# Patient Record
Sex: FEMALE | Race: WHITE | Employment: FULL TIME | URBAN - METROPOLITAN AREA
[De-identification: names, ages, dates, MRNs, and addresses within clinical notes are randomized per-mention and may not be internally consistent; named-entity substitution may affect disease eponyms.]

---

## 2017-01-18 ENCOUNTER — NURSE ONLY (OUTPATIENT)
Dept: HEMATOLOGY/ONCOLOGY | Age: 52
End: 2017-01-18
Attending: INTERNAL MEDICINE
Payer: COMMERCIAL

## 2017-01-18 DIAGNOSIS — D50.9 IRON DEFICIENCY ANEMIA, UNSPECIFIED IRON DEFICIENCY ANEMIA TYPE: ICD-10-CM

## 2017-01-18 DIAGNOSIS — R76.8 ELEVATED SERUM IMMUNOGLOBULIN FREE LIGHT CHAINS: ICD-10-CM

## 2017-01-18 LAB
BASOPHILS # BLD AUTO: 0.07 X10(3) UL (ref 0–0.1)
BASOPHILS NFR BLD AUTO: 1.2 %
DEPRECATED HBV CORE AB SER IA-ACNC: 248.7 NG/ML (ref 10–291)
EOSINOPHIL # BLD AUTO: 0.1 X10(3) UL (ref 0–0.3)
EOSINOPHIL NFR BLD AUTO: 1.8 %
ERYTHROCYTE [DISTWIDTH] IN BLOOD BY AUTOMATED COUNT: 14.1 % (ref 11.5–16)
HCT VFR BLD AUTO: 42.5 % (ref 34–50)
HGB BLD-MCNC: 13.6 G/DL (ref 12–16)
IMMATURE GRANULOCYTE COUNT: 0.02 X10(3) UL (ref 0–1)
IMMATURE GRANULOCYTE RATIO %: 0.4 %
IRON SATURATION: 26 % (ref 13–45)
IRON: 97 UG/DL (ref 28–170)
LYMPHOCYTES # BLD AUTO: 1.56 X10(3) UL (ref 0.9–4)
LYMPHOCYTES NFR BLD AUTO: 27.8 %
MCH RBC QN AUTO: 29.1 PG (ref 27–33.2)
MCHC RBC AUTO-ENTMCNC: 32 G/DL (ref 31–37)
MCV RBC AUTO: 90.8 FL (ref 81–100)
MONOCYTES # BLD AUTO: 0.59 X10(3) UL (ref 0.1–0.6)
MONOCYTES NFR BLD AUTO: 10.5 %
NEUTROPHIL ABS PRELIM: 3.28 X10 (3) UL (ref 1.3–6.7)
NEUTROPHILS # BLD AUTO: 3.28 X10(3) UL (ref 1.3–6.7)
NEUTROPHILS NFR BLD AUTO: 58.3 %
PLATELET # BLD AUTO: 328 10(3)UL (ref 150–450)
RBC # BLD AUTO: 4.68 X10(6)UL (ref 3.8–5.1)
RED CELL DISTRIBUTION WIDTH-SD: 47.3 FL (ref 35.1–46.3)
TOTAL IRON BINDING CAPACITY: 374 UG/DL (ref 298–536)
TRANSFERRIN: 251 MG/DL (ref 200–360)
WBC # BLD AUTO: 5.6 X10(3) UL (ref 4–13)

## 2017-01-18 PROCEDURE — 84165 PROTEIN E-PHORESIS SERUM: CPT

## 2017-01-18 PROCEDURE — 36415 COLL VENOUS BLD VENIPUNCTURE: CPT

## 2017-01-18 PROCEDURE — 83883 ASSAY NEPHELOMETRY NOT SPEC: CPT

## 2017-01-18 PROCEDURE — 83550 IRON BINDING TEST: CPT

## 2017-01-18 PROCEDURE — 83540 ASSAY OF IRON: CPT

## 2017-01-18 PROCEDURE — 82728 ASSAY OF FERRITIN: CPT

## 2017-01-18 PROCEDURE — 85025 COMPLETE CBC W/AUTO DIFF WBC: CPT

## 2017-01-18 PROCEDURE — 86334 IMMUNOFIX E-PHORESIS SERUM: CPT

## 2017-01-20 LAB
A/G RATIO: 1.46
ALBUMIN, SERUM: 4.22 G/DL (ref 3.5–4.8)
ALPHA-1 GLOBULIN: 0.19 G/DL (ref 0.1–0.3)
ALPHA-2 GLOBULIN: 1 G/DL (ref 0.6–1)
BETA GLOBULIN: 0.76 G/DL (ref 0.7–1.2)
GAMMA GLOBULIN: 0.93 G/DL (ref 0.6–1.6)
KAPPA FREE LIGHT CHAIN: 2.31 MG/DL (ref 0.33–1.94)
KAPPA/LAMBDA FLC RATIO: 1.21 (ref 0.26–1.65)
LAMBDA FREE LIGHT CHAIN: 1.9 MG/DL (ref 0.57–2.63)
TOTAL PROTEIN,SERUM: 7.1 G/DL (ref 6.1–8.3)

## 2017-03-08 ENCOUNTER — APPOINTMENT (OUTPATIENT)
Dept: HEMATOLOGY/ONCOLOGY | Age: 52
End: 2017-03-08
Attending: INTERNAL MEDICINE
Payer: COMMERCIAL

## 2017-07-28 ENCOUNTER — APPOINTMENT (OUTPATIENT)
Dept: GENERAL RADIOLOGY | Age: 52
End: 2017-07-28
Attending: PHYSICIAN ASSISTANT
Payer: COMMERCIAL

## 2017-07-28 ENCOUNTER — HOSPITAL ENCOUNTER (EMERGENCY)
Age: 52
Discharge: HOME | End: 2017-07-28
Attending: EMERGENCY MEDICINE
Payer: COMMERCIAL

## 2017-07-28 VITALS
TEMPERATURE: 98 F | DIASTOLIC BLOOD PRESSURE: 96 MMHG | OXYGEN SATURATION: 98 % | HEIGHT: 64 IN | WEIGHT: 137 LBS | RESPIRATION RATE: 16 BRPM | HEART RATE: 82 BPM | SYSTOLIC BLOOD PRESSURE: 127 MMHG | BODY MASS INDEX: 23.39 KG/M2

## 2017-07-28 DIAGNOSIS — S52.502A CLOSED FRACTURE OF LEFT DISTAL RADIUS AND ULNA, INITIAL ENCOUNTER: Primary | ICD-10-CM

## 2017-07-28 DIAGNOSIS — S52.602A CLOSED FRACTURE OF LEFT DISTAL RADIUS AND ULNA, INITIAL ENCOUNTER: Primary | ICD-10-CM

## 2017-07-28 PROCEDURE — 99284 EMERGENCY DEPT VISIT MOD MDM: CPT

## 2017-07-28 PROCEDURE — 73130 X-RAY EXAM OF HAND: CPT | Performed by: EMERGENCY MEDICINE

## 2017-07-28 PROCEDURE — 73130 X-RAY EXAM OF HAND: CPT | Performed by: PHYSICIAN ASSISTANT

## 2017-07-28 PROCEDURE — 73090 X-RAY EXAM OF FOREARM: CPT | Performed by: PHYSICIAN ASSISTANT

## 2017-07-28 PROCEDURE — 29125 APPL SHORT ARM SPLINT STATIC: CPT

## 2017-07-28 RX ORDER — HYDROCODONE BITARTRATE AND ACETAMINOPHEN 5; 325 MG/1; MG/1
1 TABLET ORAL ONCE
Status: COMPLETED | OUTPATIENT
Start: 2017-07-28 | End: 2017-07-28

## 2017-07-28 RX ORDER — HYDROCODONE BITARTRATE AND ACETAMINOPHEN 5; 325 MG/1; MG/1
1 TABLET ORAL EVERY 4 HOURS PRN
Qty: 15 TABLET | Refills: 0 | Status: SHIPPED | OUTPATIENT
Start: 2017-07-28 | End: 2017-08-04

## 2017-07-28 NOTE — ED INITIAL ASSESSMENT (HPI)
Pt states she injured her left hand yesterday. No deformity noted. + swelling noted with bruising. + CMS noted. States she took \"ibuprofen at 9 am today\".

## 2017-07-28 NOTE — ED PROVIDER NOTES
Patient Seen in: THE [facility] Emergency Department In [place]    History   Patient presents with:  Upper Extremity Injury (musculoskeletal)    Stated Complaint: LEFT HAND INJURY    SHANNON Iyer is a 41-year-old female presents today for evaluation of left ha sucralfate 1 G Oral Tab,  Take 1 g by mouth 4 (four) times daily before meals and nightly. BuPROPion HCl ER, SR, (WELLBUTRIN SR) 150 MG Oral Tablet 12 Hr,  Take 150 mg by mouth 2 (two) times daily.        Family History   Problem Relation Age of Onset   • Neurological: She is alert and oriented to person, place, and time. Skin: Skin is warm and dry. Nursing note and vitals reviewed.             ED Course   Labs Reviewed - No data to display    =========================================================== Hazel Geiger MD on 7/28/2017 at 12:46     Approved by: Hazel Geiger MD            A sugar tong fiberglass postmold was placed on the patient that is secured with ace wraps.   The affected area and the joints proximal and distal to the area are properly im

## 2017-07-31 ENCOUNTER — CHARTING TRANS (OUTPATIENT)
Dept: OTHER | Age: 52
End: 2017-07-31

## 2017-08-01 ENCOUNTER — TELEPHONE (OUTPATIENT)
Dept: HEMATOLOGY/ONCOLOGY | Facility: HOSPITAL | Age: 52
End: 2017-08-01

## 2017-08-01 NOTE — TELEPHONE ENCOUNTER
Fractured wrist and scheduled for surgery tomorrow. Asking if she should stop her daily Aspirin? Per Dr. Ronnie Simons patient should alert surgeon and he should make the decision to hold.     Encouraged to follow up with us and have ordered labs drawn and MD burris

## 2017-11-17 ENCOUNTER — TELEPHONE (OUTPATIENT)
Dept: HEMATOLOGY/ONCOLOGY | Facility: HOSPITAL | Age: 52
End: 2017-11-17

## 2017-11-17 DIAGNOSIS — R76.0 ANTIPHOSPHOLIPID ANTIBODY POSITIVE: ICD-10-CM

## 2017-11-17 DIAGNOSIS — D50.9 IRON DEFICIENCY ANEMIA: ICD-10-CM

## 2017-11-17 NOTE — TELEPHONE ENCOUNTER
Patient last seen in December 2016 for iron deficiency anemia and elevated monoclonal protein. Called today to report increase fatigue. States she is seeing a Gastroenterologist for recent blood in stool. Asking to have labs drawn to check her \"anemia. \"

## 2018-02-20 ENCOUNTER — OFFICE VISIT (OUTPATIENT)
Dept: OCCUPATIONAL MEDICINE | Age: 53
End: 2018-02-20
Attending: FAMILY MEDICINE

## 2018-04-02 ENCOUNTER — NURSE ONLY (OUTPATIENT)
Dept: HEMATOLOGY/ONCOLOGY | Facility: HOSPITAL | Age: 53
End: 2018-04-02
Attending: INTERNAL MEDICINE
Payer: COMMERCIAL

## 2018-04-02 DIAGNOSIS — R76.0 ANTIPHOSPHOLIPID ANTIBODY POSITIVE: ICD-10-CM

## 2018-04-02 DIAGNOSIS — D50.9 IRON DEFICIENCY ANEMIA: ICD-10-CM

## 2018-04-02 PROCEDURE — 36415 COLL VENOUS BLD VENIPUNCTURE: CPT

## 2018-04-02 PROCEDURE — 85025 COMPLETE CBC W/AUTO DIFF WBC: CPT

## 2018-04-02 PROCEDURE — 82728 ASSAY OF FERRITIN: CPT

## 2018-04-02 PROCEDURE — 83540 ASSAY OF IRON: CPT

## 2018-04-02 PROCEDURE — 83550 IRON BINDING TEST: CPT

## 2018-04-03 ENCOUNTER — TELEPHONE (OUTPATIENT)
Dept: HEMATOLOGY/ONCOLOGY | Facility: HOSPITAL | Age: 53
End: 2018-04-03

## 2018-04-03 NOTE — TELEPHONE ENCOUNTER
Radha Millan APN  P [facility]             Pls call pt and let her know her ferritin is still within acceptable range.  Thanks.  Labs in a month.  Check to see if she is on iron PO.  Thanks. Pt states she is taking 27 mg of iron.

## 2018-04-09 ENCOUNTER — TELEPHONE (OUTPATIENT)
Dept: HEMATOLOGY/ONCOLOGY | Facility: HOSPITAL | Age: 53
End: 2018-04-09

## 2018-04-09 NOTE — TELEPHONE ENCOUNTER
Dino Pedraza MD   You 32 minutes ago (9:27 AM)     With her iron levels as good as they are, that's not the cause of her fatigue.  She should contact her primary care provider to evaluate the fatigue further.  (Routing comment)

## 2018-04-09 NOTE — TELEPHONE ENCOUNTER
Spoke with pt this morning, pt states she is feeling very exhausted and fatigued and is trying to figure out why. States she had labs done last week and was told that her ferritin and iron looked good. Pt denies any bleeding.  Reports she is taking her iron

## 2018-07-03 ENCOUNTER — OFFICE VISIT (OUTPATIENT)
Dept: FAMILY MEDICINE CLINIC | Facility: CLINIC | Age: 53
End: 2018-07-03

## 2018-07-03 ENCOUNTER — HOSPITAL ENCOUNTER (OUTPATIENT)
Dept: MAMMOGRAPHY | Age: 53
Discharge: HOME | End: 2018-07-03
Attending: FAMILY MEDICINE
Payer: COMMERCIAL

## 2018-07-03 VITALS
HEART RATE: 70 BPM | HEIGHT: 63.25 IN | BODY MASS INDEX: 26.92 KG/M2 | TEMPERATURE: 98 F | SYSTOLIC BLOOD PRESSURE: 134 MMHG | RESPIRATION RATE: 16 BRPM | DIASTOLIC BLOOD PRESSURE: 100 MMHG | WEIGHT: 153.81 LBS

## 2018-07-03 DIAGNOSIS — I10 ESSENTIAL HYPERTENSION: ICD-10-CM

## 2018-07-03 DIAGNOSIS — Z01.419 WELL WOMAN EXAM WITH ROUTINE GYNECOLOGICAL EXAM: Primary | ICD-10-CM

## 2018-07-03 DIAGNOSIS — Z12.39 SCREENING FOR BREAST CANCER: ICD-10-CM

## 2018-07-03 DIAGNOSIS — F41.9 ANXIETY: ICD-10-CM

## 2018-07-03 DIAGNOSIS — F43.9 STRESS AT HOME: ICD-10-CM

## 2018-07-03 LAB
ALBUMIN SERPL-MCNC: 4 G/DL (ref 3.5–4.8)
ALP LIVER SERPL-CCNC: 86 U/L (ref 41–108)
ALT SERPL-CCNC: 20 U/L (ref 14–54)
AST SERPL-CCNC: 23 U/L (ref 15–41)
BILIRUB SERPL-MCNC: 0.5 MG/DL (ref 0.1–2)
BUN BLD-MCNC: 7 MG/DL (ref 8–20)
CALCIUM BLD-MCNC: 9.2 MG/DL (ref 8.3–10.3)
CHLORIDE: 100 MMOL/L (ref 101–111)
CHOLEST SMN-MCNC: 282 MG/DL (ref ?–200)
CO2: 30 MMOL/L (ref 22–32)
CREAT BLD-MCNC: 0.72 MG/DL (ref 0.55–1.02)
EST. AVERAGE GLUCOSE BLD GHB EST-MCNC: 103 MG/DL (ref 68–126)
GLUCOSE BLD-MCNC: 88 MG/DL (ref 70–99)
HBA1C MFR BLD HPLC: 5.2 % (ref ?–5.7)
HDLC SERPL-MCNC: 117 MG/DL (ref 45–?)
HDLC SERPL: 2.41 {RATIO} (ref ?–4.44)
LDLC SERPL CALC-MCNC: 150 MG/DL (ref ?–130)
M PROTEIN MFR SERPL ELPH: 7.9 G/DL (ref 6.1–8.3)
NONHDLC SERPL-MCNC: 165 MG/DL (ref ?–130)
POTASSIUM SERPL-SCNC: 4.2 MMOL/L (ref 3.6–5.1)
SODIUM SERPL-SCNC: 138 MMOL/L (ref 136–144)
TRIGL SERPL-MCNC: 74 MG/DL (ref ?–150)
VLDLC SERPL CALC-MCNC: 15 MG/DL (ref 5–40)

## 2018-07-03 PROCEDURE — 77067 SCR MAMMO BI INCL CAD: CPT | Performed by: FAMILY MEDICINE

## 2018-07-03 PROCEDURE — 83036 HEMOGLOBIN GLYCOSYLATED A1C: CPT | Performed by: FAMILY MEDICINE

## 2018-07-03 PROCEDURE — 88175 CYTOPATH C/V AUTO FLUID REDO: CPT | Performed by: FAMILY MEDICINE

## 2018-07-03 PROCEDURE — 99386 PREV VISIT NEW AGE 40-64: CPT | Performed by: FAMILY MEDICINE

## 2018-07-03 PROCEDURE — 87624 HPV HI-RISK TYP POOLED RSLT: CPT | Performed by: FAMILY MEDICINE

## 2018-07-03 PROCEDURE — 80053 COMPREHEN METABOLIC PANEL: CPT | Performed by: FAMILY MEDICINE

## 2018-07-03 PROCEDURE — 80061 LIPID PANEL: CPT | Performed by: FAMILY MEDICINE

## 2018-07-03 PROCEDURE — 36415 COLL VENOUS BLD VENIPUNCTURE: CPT | Performed by: FAMILY MEDICINE

## 2018-07-03 RX ORDER — BUPROPION HYDROCHLORIDE 150 MG/1
150 TABLET ORAL DAILY
Qty: 30 TABLET | Refills: 0 | Status: SHIPPED | OUTPATIENT
Start: 2018-07-03 | End: 2018-07-17

## 2018-07-03 RX ORDER — DULOXETIN HYDROCHLORIDE 60 MG/1
120 CAPSULE, DELAYED RELEASE ORAL DAILY
COMMUNITY
Start: 2018-03-22 | End: 2018-07-03

## 2018-07-03 RX ORDER — DULOXETIN HYDROCHLORIDE 60 MG/1
120 CAPSULE, DELAYED RELEASE ORAL DAILY
Qty: 60 CAPSULE | Refills: 5 | Status: SHIPPED | OUTPATIENT
Start: 2018-07-03 | End: 2018-12-18

## 2018-07-03 RX ORDER — LISINOPRIL AND HYDROCHLOROTHIAZIDE 25; 20 MG/1; MG/1
1 TABLET ORAL DAILY
Qty: 30 TABLET | Refills: 0 | Status: SHIPPED | OUTPATIENT
Start: 2018-07-03 | End: 2018-07-29

## 2018-07-03 RX ORDER — HYDROCHLOROTHIAZIDE 25 MG/1
25 TABLET ORAL DAILY
COMMUNITY
Start: 2018-03-22 | End: 2018-07-03 | Stop reason: DRUGHIGH

## 2018-07-03 NOTE — PROGRESS NOTES
HPI:   Sarahy Leung is a 47year old female who presents for a complete physical exam. Symptoms: denies discharge, itching, burning or dysuria, is S/P DILAN, ovaries preserved. Patient complains of as below, increased stress. Single mom, 2 boys.    Wo by mouth daily. Disp: 30 tablet Rfl: 0   DULoxetine HCl 60 MG Oral Cap DR Particles Take 2 capsules (120 mg total) by mouth daily.  Disp: 60 capsule Rfl: 5   ferrous sulfate 325 (65 FE) MG Oral Tab EC Take 325 mg by mouth daily with breakfast. Disp:  Rfl: dysuria, vaginal discharge or itching,periods absent, s/p hysterectomy   MUSCULOSKELETAL: denies back pain  NEURO: denies headaches  PSYCHE: as above, no depression   HEMATOLOGIC: denies hx of anemia  ENDOCRINE: denies thyroid history  ALL/ASTHMA: denies h - restart lisinopril in combo with HCTZ. Anxiety and Stress at home - refer for counseling, start welbutrin to help with focus at low dose, follow up in 4-6 weeks.        Orders Placed This Encounter      Comp Metabolic Panel (14)      Lipid Panel      He

## 2018-07-05 LAB — HPV I/H RISK 1 DNA SPEC QL NAA+PROBE: NEGATIVE

## 2018-07-12 ENCOUNTER — TELEPHONE (OUTPATIENT)
Dept: FAMILY MEDICINE CLINIC | Facility: CLINIC | Age: 53
End: 2018-07-12

## 2018-07-12 DIAGNOSIS — N39.0 URINARY TRACT INFECTION WITHOUT HEMATURIA, SITE UNSPECIFIED: Primary | ICD-10-CM

## 2018-07-12 DIAGNOSIS — F41.9 ANXIETY: ICD-10-CM

## 2018-07-12 RX ORDER — NITROFURANTOIN 25; 75 MG/1; MG/1
100 CAPSULE ORAL 2 TIMES DAILY
Qty: 14 CAPSULE | Refills: 0 | Status: SHIPPED | OUTPATIENT
Start: 2018-07-12 | End: 2018-07-19

## 2018-07-12 NOTE — TELEPHONE ENCOUNTER
Left detailed message-ok per HIPPA form-advised that script called in and if she is not feeling better she needs to come in for appt with Dr Jaskaran Carroll. Advised to call back to let me know she got the message.

## 2018-07-12 NOTE — TELEPHONE ENCOUNTER
Pt called, thinks she is getting a UTI and would like us to call in an antibiotic for her as she cannot come in for a office visit until next week as she has to work.   Pt states she has a history of UTI that landed her in the hospital and she developed sep

## 2018-07-17 RX ORDER — BUPROPION HYDROCHLORIDE 300 MG/1
300 TABLET ORAL DAILY
Qty: 30 TABLET | Refills: 0 | Status: SHIPPED | OUTPATIENT
Start: 2018-07-17 | End: 2018-08-06

## 2018-07-17 NOTE — TELEPHONE ENCOUNTER
Lets try increasing the welbutrin to 300 mg daily, you can double up until you are out of meds, I sent a new script. Follow up with me in 1 month.    This medicaiton works for focus, but not always for panic, so if it's not helping in the next few weeks,

## 2018-07-17 NOTE — TELEPHONE ENCOUNTER
Patient states her UTI cleared up with the antibiotic. Says she has a question about her Wellbutrin.   States she is focusing more but her anxiety is a little bit better but she is still having episodes of anxiety where it feels like someone is choking h

## 2018-07-29 DIAGNOSIS — I10 ESSENTIAL HYPERTENSION: ICD-10-CM

## 2018-07-30 RX ORDER — LISINOPRIL AND HYDROCHLOROTHIAZIDE 25; 20 MG/1; MG/1
TABLET ORAL
Qty: 30 TABLET | Refills: 0 | Status: SHIPPED | OUTPATIENT
Start: 2018-07-30 | End: 2018-08-28

## 2018-07-30 NOTE — TELEPHONE ENCOUNTER
Last refill: 7- #30 with 0 refills  Last Visit: 7-  Next Visit: No future appointments. Forward to Dr. Michoacano Austin please advise on refills. Thanks.

## 2018-08-02 NOTE — TELEPHONE ENCOUNTER
Has appt scheduled  Future Appointments  Date Time Provider Jc Romo   8/3/2018 3:45 PM Juana Brantley [facility] [place]

## 2018-08-06 PROBLEM — F41.9 ANXIETY: Status: ACTIVE | Noted: 2018-08-06

## 2018-08-06 PROBLEM — I10 ESSENTIAL HYPERTENSION: Status: ACTIVE | Noted: 2018-08-06

## 2018-08-06 NOTE — PROGRESS NOTES
Fidel Elizabeth is a 47year old female. Patient presents with: Follow - Up: on medications per pt      HPI:   Feeling better. Anxiety better, focus is better. Her boss just emailed her and told her she was doing well. She is more relaxed.  No depression 127/96  12/21/16 : 123/84  12/16/16 : 129/87  12/14/16 : 119/86      Wt Readings from Last 6 Encounters:  08/06/18 : 155 lb  07/03/18 : 153 lb 12.8 oz  07/28/17 : 137 lb  12/07/16 : 154 lb 3.2 oz  01/14/16 : 168 lb  12/10/15 : 168 lb      REVIEW OF SYSTEMS

## 2018-08-07 ENCOUNTER — TELEPHONE (OUTPATIENT)
Dept: FAMILY MEDICINE CLINIC | Facility: CLINIC | Age: 53
End: 2018-08-07

## 2018-08-07 RX ORDER — FLUTICASONE PROPIONATE 50 MCG
2 SPRAY, SUSPENSION (ML) NASAL DAILY
Qty: 3 BOTTLE | Refills: 0 | Status: SHIPPED | OUTPATIENT
Start: 2018-08-07 | End: 2019-08-02

## 2018-08-07 NOTE — TELEPHONE ENCOUNTER
Fax received from [facility] requesting refill of Fluticasone Prop 50 mcg nasal spray    Last OV 8/6/18  Med not filled previously

## 2018-08-08 NOTE — TELEPHONE ENCOUNTER
Fax from [employer] received. PA for flonase was denied. Per Dr Dustin Tsang, will need to pay out of pocket. Advised ok to try any OTC nasal spray. Can use which ever is most affordable.     Patient notified via detailed voicemail left at cell number (ok per

## 2018-08-12 DIAGNOSIS — F41.9 ANXIETY: ICD-10-CM

## 2018-08-13 RX ORDER — BUPROPION HYDROCHLORIDE 300 MG/1
TABLET ORAL
Qty: 30 TABLET | Refills: 0 | OUTPATIENT
Start: 2018-08-13

## 2018-08-28 ENCOUNTER — TELEPHONE (OUTPATIENT)
Dept: FAMILY MEDICINE CLINIC | Facility: CLINIC | Age: 53
End: 2018-08-28

## 2018-08-28 DIAGNOSIS — I10 ESSENTIAL HYPERTENSION: ICD-10-CM

## 2018-08-28 DIAGNOSIS — B00.2 ORAL HERPES: Primary | ICD-10-CM

## 2018-08-28 RX ORDER — VALACYCLOVIR HYDROCHLORIDE 1 G/1
2 TABLET, FILM COATED ORAL EVERY 12 HOURS SCHEDULED
Qty: 24 TABLET | Refills: 0 | Status: SHIPPED | OUTPATIENT
Start: 2018-08-28 | End: 2018-12-24

## 2018-08-28 RX ORDER — LISINOPRIL AND HYDROCHLOROTHIAZIDE 25; 20 MG/1; MG/1
TABLET ORAL
Qty: 90 TABLET | Refills: 3 | Status: SHIPPED | OUTPATIENT
Start: 2018-08-28 | End: 2019-10-14

## 2018-08-28 NOTE — TELEPHONE ENCOUNTER
Has she been on medication for cold sores in the past? How often does she get them? How long has she had this one? I can send something in, but if this becomes a frequent issue, then she needs to come see me to talk about it a little more.    I would s

## 2018-08-28 NOTE — TELEPHONE ENCOUNTER
Pt would like a oral medication for cold sore. Please send to   [id number] [address] [place], [place] - [address] [address], [phone], [phone]

## 2018-08-28 NOTE — TELEPHONE ENCOUNTER
Patient returned call. Patient notified and verbalized understanding. States she been on PRN Valtrex for years. 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   States the last 5 months or so she has been getting them

## 2018-08-28 NOTE — TELEPHONE ENCOUNTER
Script sent. If she still has outbreaks this frequently 6 months from now, then consider suppressive therapy.

## 2018-09-27 ENCOUNTER — TELEPHONE (OUTPATIENT)
Dept: FAMILY MEDICINE CLINIC | Facility: CLINIC | Age: 53
End: 2018-09-27

## 2018-09-27 DIAGNOSIS — B00.1 RECURRENT COLD SORES: Primary | ICD-10-CM

## 2018-09-27 RX ORDER — VALACYCLOVIR HYDROCHLORIDE 500 MG/1
500 TABLET, FILM COATED ORAL DAILY
Qty: 30 TABLET | Refills: 4 | Status: SHIPPED | OUTPATIENT
Start: 2018-09-27 | End: 2019-03-19

## 2018-09-27 NOTE — TELEPHONE ENCOUNTER
Left detailed message advising Dr Yovanny Greene note. Ok per [place] form. Advised to call office with any questions.

## 2018-09-27 NOTE — TELEPHONE ENCOUNTER
I sent a script for a suppression dose, 500 mg once daily. Start that, hopefully it will help keep them at [facility] [address]. You can still use higher dose as needed for outbreaks. Let re-evaluate after the new year to see if that is helping.

## 2018-09-27 NOTE — TELEPHONE ENCOUNTER
Pt would like to try maintenance med for the cold sore, as discussed with CAR. Recurring very frequently. Pls call

## 2018-10-24 DIAGNOSIS — F41.9 ANXIETY: ICD-10-CM

## 2018-10-24 RX ORDER — BUPROPION HYDROCHLORIDE 300 MG/1
TABLET ORAL
Qty: 90 TABLET | Refills: 0 | Status: SHIPPED | OUTPATIENT
Start: 2018-10-24 | End: 2018-12-26

## 2018-11-07 ENCOUNTER — HOSPITAL ENCOUNTER (EMERGENCY)
Age: 53
Discharge: HOME | End: 2018-11-07
Attending: EMERGENCY MEDICINE
Payer: COMMERCIAL

## 2018-11-07 ENCOUNTER — APPOINTMENT (OUTPATIENT)
Dept: MRI IMAGING | Age: 53
End: 2018-11-07
Attending: EMERGENCY MEDICINE
Payer: COMMERCIAL

## 2018-11-07 VITALS
OXYGEN SATURATION: 99 % | TEMPERATURE: 98 F | WEIGHT: 154.31 LBS | DIASTOLIC BLOOD PRESSURE: 89 MMHG | HEART RATE: 90 BPM | SYSTOLIC BLOOD PRESSURE: 124 MMHG | RESPIRATION RATE: 20 BRPM | BODY MASS INDEX: 27 KG/M2

## 2018-11-07 DIAGNOSIS — R25.1 TREMORS OF NERVOUS SYSTEM: Primary | ICD-10-CM

## 2018-11-07 PROCEDURE — 36415 COLL VENOUS BLD VENIPUNCTURE: CPT

## 2018-11-07 PROCEDURE — 80053 COMPREHEN METABOLIC PANEL: CPT | Performed by: EMERGENCY MEDICINE

## 2018-11-07 PROCEDURE — 70553 MRI BRAIN STEM W/O & W/DYE: CPT | Performed by: EMERGENCY MEDICINE

## 2018-11-07 PROCEDURE — 84443 ASSAY THYROID STIM HORMONE: CPT | Performed by: EMERGENCY MEDICINE

## 2018-11-07 PROCEDURE — A9575 INJ GADOTERATE MEGLUMI 0.1ML: HCPCS | Performed by: EMERGENCY MEDICINE

## 2018-11-07 PROCEDURE — 85025 COMPLETE CBC W/AUTO DIFF WBC: CPT | Performed by: EMERGENCY MEDICINE

## 2018-11-07 PROCEDURE — 99284 EMERGENCY DEPT VISIT MOD MDM: CPT

## 2018-11-07 PROCEDURE — 81003 URINALYSIS AUTO W/O SCOPE: CPT | Performed by: EMERGENCY MEDICINE

## 2018-11-07 NOTE — ED PROVIDER NOTES
Patient Seen in: THE [facility] Emergency Department In [place]    History   Patient presents with:  Altered Mental Status (neurologic)    Stated Complaint: arms/legs shaking x 1 week. today felt like tongue is swollen.  no blurred visio*    HPI    Is a 52-yea kg/m²         Physical Exam    General: Patient is homeless, somewhat tearful 50-year-old woman  HEENT: Normal cephalic atraumatic. Nonicteric sclera. Moist mucous membranes. No meningismus. No adenopathy.   No objective tongue swelling  Lungs: No tachy anxiety related. MRI is nonspecific without acute pathology. Will continue an aspirin a day.   We will follow-up with neurology if symptoms persist.        Disposition and Plan     Clinical Impression:  Tremors of nervous system  (primary encounter diagno

## 2018-11-07 NOTE — PROGRESS NOTES
My pt was seen in ER, please call and schedule her to follow up with me within the next week. Have her call sooner if symptoms are worsening.

## 2018-11-08 ENCOUNTER — TELEPHONE (OUTPATIENT)
Dept: FAMILY MEDICINE CLINIC | Facility: CLINIC | Age: 53
End: 2018-11-08

## 2018-11-08 NOTE — TELEPHONE ENCOUNTER
Pt called, who would we would  Recommend as a neurologist for pt? She was in the [employer] last night for shaking-started about a week ago. Loss of memory.   Please call pt at [phone]

## 2018-11-08 NOTE — TELEPHONE ENCOUNTER
.Patient notified via detailed voicemail left at cell number (ok per  HIPAA consent)    Number to Dr Maria Esther Scales provided.   Also advised to call and schedule f/u appointment with Dr John Recinos

## 2018-12-18 DIAGNOSIS — F41.9 ANXIETY: ICD-10-CM

## 2018-12-18 RX ORDER — DULOXETIN HYDROCHLORIDE 60 MG/1
120 CAPSULE, DELAYED RELEASE ORAL DAILY
Qty: 180 CAPSULE | Refills: 1 | Status: SHIPPED | OUTPATIENT
Start: 2018-12-18 | End: 2019-06-13

## 2018-12-18 NOTE — TELEPHONE ENCOUNTER
Fax from [facility] requesting refill of Duloxetine 60 mg    Last OV 8/6/18  Last refilled 7/3/18  #60  5 refills

## 2018-12-21 DIAGNOSIS — B00.2 ORAL HERPES: ICD-10-CM

## 2018-12-21 NOTE — TELEPHONE ENCOUNTER
ValACYclovir HCl 500 MG Oral Tab 30 tablet 4 9/27/2018    Sig :  Take 1 tablet (500 mg total) by mouth daily. Route:   Oral       [facility] IN [place].

## 2018-12-21 NOTE — TELEPHONE ENCOUNTER
Attempted to contact patient to confirm which dose of valacyclovir she is requesting. Unable to leave message, mailbox is full. Daily script for 500 mg tabs refilled 9/27/18  #30  4 refills. Should have refill available at pharmacy.     Does she need the

## 2018-12-24 RX ORDER — VALACYCLOVIR HYDROCHLORIDE 1 G/1
2 TABLET, FILM COATED ORAL EVERY 12 HOURS SCHEDULED
Qty: 24 TABLET | Refills: 0 | Status: SHIPPED | OUTPATIENT
Start: 2018-12-24 | End: 2019-01-15

## 2018-12-24 NOTE — TELEPHONE ENCOUNTER
Attempted to contact patient to confirm dose but was unable to leave a message. Mailbox full    Discussed with Dr Aide Rogers.  Will send PRN dose as daily dose should be available until end of February

## 2018-12-26 DIAGNOSIS — F41.9 ANXIETY: ICD-10-CM

## 2018-12-26 NOTE — TELEPHONE ENCOUNTER
Last refilled on 10/24/18 for # 90 with 0 refills  Last OV 8/6/18  No future appointments. Thank you.

## 2018-12-27 RX ORDER — BUPROPION HYDROCHLORIDE 300 MG/1
TABLET ORAL
Qty: 90 TABLET | Refills: 1 | Status: SHIPPED | OUTPATIENT
Start: 2018-12-27 | End: 2019-04-10 | Stop reason: DRUGHIGH

## 2019-01-15 DIAGNOSIS — B00.2 ORAL HERPES: ICD-10-CM

## 2019-01-15 RX ORDER — VALACYCLOVIR HYDROCHLORIDE 1 G/1
2 TABLET, FILM COATED ORAL EVERY 12 HOURS SCHEDULED
Qty: 24 TABLET | Refills: 0 | Status: SHIPPED | OUTPATIENT
Start: 2019-01-15 | End: 2019-11-25 | Stop reason: ALTCHOICE

## 2019-03-19 DIAGNOSIS — F41.9 ANXIETY: ICD-10-CM

## 2019-03-19 DIAGNOSIS — B00.1 RECURRENT COLD SORES: ICD-10-CM

## 2019-03-19 RX ORDER — BUPROPION HYDROCHLORIDE 150 MG/1
TABLET ORAL
Qty: 30 TABLET | Refills: 0 | Status: SHIPPED | OUTPATIENT
Start: 2019-03-19 | End: 2019-04-10

## 2019-03-19 RX ORDER — VALACYCLOVIR HYDROCHLORIDE 500 MG/1
TABLET, FILM COATED ORAL
Qty: 24 TABLET | Refills: 0 | Status: SHIPPED | OUTPATIENT
Start: 2019-03-19 | End: 2020-04-23

## 2019-03-19 NOTE — TELEPHONE ENCOUNTER
Valacyclovir Last refilled on 1/15/19 for # 24 with 0 refills  Bupropion refilled 12/27/18 #90 1 refill  Last OV 8/6/18  No future appointments. Thank you.

## 2019-04-10 DIAGNOSIS — F41.9 ANXIETY: ICD-10-CM

## 2019-04-10 RX ORDER — BUPROPION HYDROCHLORIDE 150 MG/1
TABLET ORAL
Qty: 30 TABLET | Refills: 0 | Status: SHIPPED | OUTPATIENT
Start: 2019-04-10 | End: 2019-11-25

## 2019-04-10 NOTE — TELEPHONE ENCOUNTER
Last refilled on 3/19/19 for # 30 with 0 refills  Last OV 8/6/18  No future appointments. Thank you.

## 2019-04-14 DIAGNOSIS — B00.1 RECURRENT COLD SORES: ICD-10-CM

## 2019-04-15 RX ORDER — VALACYCLOVIR HYDROCHLORIDE 500 MG/1
TABLET, FILM COATED ORAL
Qty: 24 TABLET | Refills: 0 | OUTPATIENT
Start: 2019-04-15

## 2019-04-15 NOTE — TELEPHONE ENCOUNTER
Last refilled on 3/19/19 for # 24 with 0 refills  Last OV 8/6/18  No future appointments. Thank you.

## 2019-06-13 DIAGNOSIS — F41.9 ANXIETY: ICD-10-CM

## 2019-06-13 RX ORDER — DULOXETIN HYDROCHLORIDE 60 MG/1
120 CAPSULE, DELAYED RELEASE ORAL DAILY
Qty: 180 CAPSULE | Refills: 1 | Status: SHIPPED | OUTPATIENT
Start: 2019-06-13 | End: 2019-12-11

## 2019-06-13 NOTE — TELEPHONE ENCOUNTER
Last refilled on 12/18/18 for # 180 with 1 refills  Last OV 8/6/18  No future appointments. Thank you.

## 2019-07-01 DIAGNOSIS — F41.9 ANXIETY: ICD-10-CM

## 2019-07-01 RX ORDER — BUPROPION HYDROCHLORIDE 300 MG/1
300 TABLET ORAL DAILY
Qty: 90 TABLET | Refills: 0 | Status: SHIPPED | OUTPATIENT
Start: 2019-07-01 | End: 2019-12-02

## 2019-07-05 ENCOUNTER — TELEPHONE (OUTPATIENT)
Dept: FAMILY MEDICINE CLINIC | Facility: CLINIC | Age: 54
End: 2019-07-05

## 2019-08-09 DIAGNOSIS — I10 ESSENTIAL HYPERTENSION: ICD-10-CM

## 2019-08-09 NOTE — TELEPHONE ENCOUNTER
Bed: 10  Expected date:   Expected time:   Means of arrival: [facility]  Comments:  89 yo M  SOB     Last refill: 8/28/2018 #90 with 3 refills  Last Visit: 8/06/2018  Next Visit: No future appointments. Forward to Dr. Geno Loepz please advise on refills. Thanks.

## 2019-08-09 NOTE — TELEPHONE ENCOUNTER
She is due for a yearly check up, please schedule. I can sent script if we have something scheduled.

## 2019-10-14 RX ORDER — LISINOPRIL AND HYDROCHLOROTHIAZIDE 25; 20 MG/1; MG/1
TABLET ORAL
Qty: 90 TABLET | Refills: 3 | OUTPATIENT
Start: 2019-10-14

## 2019-10-14 RX ORDER — LISINOPRIL AND HYDROCHLOROTHIAZIDE 25; 20 MG/1; MG/1
1 TABLET ORAL
Qty: 30 TABLET | Refills: 0 | Status: SHIPPED | OUTPATIENT
Start: 2019-10-14 | End: 2019-11-12

## 2019-10-14 RX ORDER — LISINOPRIL AND HYDROCHLOROTHIAZIDE 25; 20 MG/1; MG/1
1 TABLET ORAL
Qty: 90 TABLET | Refills: 3 | Status: SHIPPED | OUTPATIENT
Start: 2019-10-14 | End: 2019-10-14 | Stop reason: CLARIF

## 2019-10-29 NOTE — TELEPHONE ENCOUNTER
Future Appointments   Date Time Provider Jc Romo   11/25/2019  6:00 PM Sherrell Zapata [facility] Vivian Perry

## 2019-11-12 DIAGNOSIS — I10 ESSENTIAL HYPERTENSION: ICD-10-CM

## 2019-11-12 RX ORDER — LISINOPRIL AND HYDROCHLOROTHIAZIDE 25; 20 MG/1; MG/1
TABLET ORAL
Qty: 30 TABLET | Refills: 0 | Status: SHIPPED | OUTPATIENT
Start: 2019-11-12 | End: 2019-11-26

## 2019-11-12 NOTE — TELEPHONE ENCOUNTER
LOV: 8/6/18   Last Refill: 10/17/19 #30 0 RF    Future Appointments   Date Time Provider Jc Romo   11/25/2019  6:00 PM Arvel Mcardle, [facility] [place]     Last BP: 124/89 11/7/18

## 2019-11-25 ENCOUNTER — OFFICE VISIT (OUTPATIENT)
Dept: FAMILY MEDICINE CLINIC | Facility: CLINIC | Age: 54
End: 2019-11-25
Payer: COMMERCIAL

## 2019-11-25 VITALS
SYSTOLIC BLOOD PRESSURE: 150 MMHG | RESPIRATION RATE: 16 BRPM | BODY MASS INDEX: 26.75 KG/M2 | HEART RATE: 80 BPM | DIASTOLIC BLOOD PRESSURE: 92 MMHG | WEIGHT: 151 LBS | HEIGHT: 63 IN | TEMPERATURE: 97 F

## 2019-11-25 DIAGNOSIS — L65.9 HAIR LOSS: ICD-10-CM

## 2019-11-25 DIAGNOSIS — Z12.39 SCREENING FOR MALIGNANT NEOPLASM OF BREAST: ICD-10-CM

## 2019-11-25 DIAGNOSIS — Z12.11 SCREENING FOR COLON CANCER: ICD-10-CM

## 2019-11-25 DIAGNOSIS — L98.9 LESION OF SKIN OF SCALP: ICD-10-CM

## 2019-11-25 DIAGNOSIS — Z01.419 WELL WOMAN EXAM WITH ROUTINE GYNECOLOGICAL EXAM: Primary | ICD-10-CM

## 2019-11-25 DIAGNOSIS — B00.2 ORAL HERPES: ICD-10-CM

## 2019-11-25 PROCEDURE — 88175 CYTOPATH C/V AUTO FLUID REDO: CPT | Performed by: FAMILY MEDICINE

## 2019-11-25 PROCEDURE — 99396 PREV VISIT EST AGE 40-64: CPT | Performed by: FAMILY MEDICINE

## 2019-11-25 PROCEDURE — 87624 HPV HI-RISK TYP POOLED RSLT: CPT | Performed by: FAMILY MEDICINE

## 2019-11-25 RX ORDER — VALACYCLOVIR HYDROCHLORIDE 1 G/1
2 TABLET, FILM COATED ORAL EVERY 12 HOURS SCHEDULED
Qty: 24 TABLET | Refills: 0 | Status: SHIPPED | OUTPATIENT
Start: 2019-11-25 | End: 2020-02-12

## 2019-11-25 RX ORDER — CLOBETASOL PROPIONATE 0.46 MG/ML
SOLUTION TOPICAL
Qty: 25 ML | Refills: 0 | Status: SHIPPED | OUTPATIENT
Start: 2019-11-25 | End: 2020-04-23 | Stop reason: ALTCHOICE

## 2019-11-26 DIAGNOSIS — I10 ESSENTIAL HYPERTENSION: ICD-10-CM

## 2019-11-26 RX ORDER — LISINOPRIL AND HYDROCHLOROTHIAZIDE 25; 20 MG/1; MG/1
1 TABLET ORAL
Qty: 90 TABLET | Refills: 3 | Status: SHIPPED | OUTPATIENT
Start: 2019-11-26 | End: 2020-10-22

## 2019-11-26 NOTE — TELEPHONE ENCOUNTER
Fax from [facility] requesting refill of lisinopril/hctz    Last OV 11/25/19  Last lab 11-7-2018   Last refilled 11/12/19  #30  0 refills    Pharmacy requesting 90 day supply

## 2019-11-29 ENCOUNTER — TELEPHONE (OUTPATIENT)
Dept: FAMILY MEDICINE CLINIC | Facility: CLINIC | Age: 54
End: 2019-11-29

## 2019-11-29 DIAGNOSIS — R87.622 LOW GRADE SQUAMOUS INTRAEPITH LESION ON CYTOLOGIC SMEAR VAGINA (LGSIL): Primary | ICD-10-CM

## 2019-11-29 NOTE — TELEPHONE ENCOUNTER
Called and left message to call back. No details left. Pap was abnormal again. Still negative HPV, but it showed low grade changes in the cells like it did last year.  Since this has been two yrs in a row, I would like her to see gyne for further testin

## 2019-12-02 ENCOUNTER — HOSPITAL ENCOUNTER (OUTPATIENT)
Dept: MAMMOGRAPHY | Age: 54
Discharge: HOME | End: 2019-12-02
Attending: FAMILY MEDICINE
Payer: COMMERCIAL

## 2019-12-02 DIAGNOSIS — F41.9 ANXIETY: ICD-10-CM

## 2019-12-02 DIAGNOSIS — Z12.39 SCREENING FOR MALIGNANT NEOPLASM OF BREAST: ICD-10-CM

## 2019-12-02 PROCEDURE — 77067 SCR MAMMO BI INCL CAD: CPT | Performed by: FAMILY MEDICINE

## 2019-12-02 RX ORDER — BUPROPION HYDROCHLORIDE 300 MG/1
TABLET ORAL
Qty: 90 TABLET | Refills: 0 | Status: SHIPPED | OUTPATIENT
Start: 2019-12-02 | End: 2020-02-25

## 2019-12-03 NOTE — TELEPHONE ENCOUNTER
Patient notified and verbalized understanding.    Number to schedule with Dr Hopkins Glendy provided

## 2019-12-11 DIAGNOSIS — F41.9 ANXIETY: ICD-10-CM

## 2019-12-11 RX ORDER — DULOXETIN HYDROCHLORIDE 60 MG/1
120 CAPSULE, DELAYED RELEASE ORAL DAILY
Qty: 180 CAPSULE | Refills: 1 | Status: SHIPPED | OUTPATIENT
Start: 2019-12-11 | End: 2020-06-06

## 2019-12-31 ENCOUNTER — TELEPHONE (OUTPATIENT)
Dept: FAMILY MEDICINE CLINIC | Facility: CLINIC | Age: 54
End: 2019-12-31

## 2020-02-11 DIAGNOSIS — B00.2 ORAL HERPES: ICD-10-CM

## 2020-02-12 RX ORDER — VALACYCLOVIR HYDROCHLORIDE 1 G/1
2 TABLET, FILM COATED ORAL EVERY 12 HOURS SCHEDULED
Qty: 24 TABLET | Refills: 0 | Status: SHIPPED | OUTPATIENT
Start: 2020-02-12 | End: 2020-08-22

## 2020-02-25 ENCOUNTER — NURSE ONLY (OUTPATIENT)
Dept: HEMATOLOGY/ONCOLOGY | Age: 55
End: 2020-02-25
Attending: INTERNAL MEDICINE
Payer: COMMERCIAL

## 2020-02-25 DIAGNOSIS — D50.9 IRON DEFICIENCY ANEMIA, UNSPECIFIED IRON DEFICIENCY ANEMIA TYPE: Primary | ICD-10-CM

## 2020-02-25 DIAGNOSIS — Z01.419 WELL WOMAN EXAM WITH ROUTINE GYNECOLOGICAL EXAM: ICD-10-CM

## 2020-02-25 DIAGNOSIS — F41.9 ANXIETY: ICD-10-CM

## 2020-02-25 LAB
ALBUMIN SERPL-MCNC: 3.6 G/DL (ref 3.4–5)
ALBUMIN/GLOB SERPL: 0.9 {RATIO} (ref 1–2)
ALP LIVER SERPL-CCNC: 79 U/L (ref 41–108)
ALT SERPL-CCNC: 21 U/L (ref 13–56)
ANION GAP SERPL CALC-SCNC: 6 MMOL/L (ref 0–18)
AST SERPL-CCNC: 23 U/L (ref 15–37)
BASOPHILS # BLD AUTO: 0.07 X10(3) UL (ref 0–0.2)
BASOPHILS NFR BLD AUTO: 1.2 %
BILIRUB SERPL-MCNC: 0.5 MG/DL (ref 0.1–2)
BUN BLD-MCNC: 11 MG/DL (ref 7–18)
BUN/CREAT SERPL: 12.8 (ref 10–20)
CALCIUM BLD-MCNC: 9.2 MG/DL (ref 8.5–10.1)
CHLORIDE SERPL-SCNC: 103 MMOL/L (ref 98–112)
CHOLEST SMN-MCNC: 257 MG/DL (ref ?–200)
CO2 SERPL-SCNC: 27 MMOL/L (ref 21–32)
CREAT BLD-MCNC: 0.86 MG/DL (ref 0.55–1.02)
DEPRECATED HBV CORE AB SER IA-ACNC: 16.9 NG/ML (ref 18–340)
DEPRECATED RDW RBC AUTO: 45.8 FL (ref 35.1–46.3)
EOSINOPHIL # BLD AUTO: 0.1 X10(3) UL (ref 0–0.7)
EOSINOPHIL NFR BLD AUTO: 1.7 %
ERYTHROCYTE [DISTWIDTH] IN BLOOD BY AUTOMATED COUNT: 12.9 % (ref 11–15)
GLOBULIN PLAS-MCNC: 4.1 G/DL (ref 2.8–4.4)
GLUCOSE BLD-MCNC: 104 MG/DL (ref 70–99)
HCT VFR BLD AUTO: 41.3 % (ref 35–48)
HDLC SERPL-MCNC: 126 MG/DL (ref 40–59)
HGB BLD-MCNC: 13.3 G/DL (ref 12–16)
IMM GRANULOCYTES # BLD AUTO: 0.02 X10(3) UL (ref 0–1)
IMM GRANULOCYTES NFR BLD: 0.3 %
IRON SATURATION: 32 % (ref 15–50)
IRON SERPL-MCNC: 145 UG/DL (ref 50–170)
LDLC SERPL CALC-MCNC: 113 MG/DL (ref ?–100)
LYMPHOCYTES # BLD AUTO: 1.63 X10(3) UL (ref 1–4)
LYMPHOCYTES NFR BLD AUTO: 27.9 %
M PROTEIN MFR SERPL ELPH: 7.7 G/DL (ref 6.4–8.2)
MCH RBC QN AUTO: 30.8 PG (ref 26–34)
MCHC RBC AUTO-ENTMCNC: 32.2 G/DL (ref 31–37)
MCV RBC AUTO: 95.6 FL (ref 80–100)
MONOCYTES # BLD AUTO: 0.55 X10(3) UL (ref 0.1–1)
MONOCYTES NFR BLD AUTO: 9.4 %
NEUTROPHILS # BLD AUTO: 3.47 X10 (3) UL (ref 1.5–7.7)
NEUTROPHILS # BLD AUTO: 3.47 X10(3) UL (ref 1.5–7.7)
NEUTROPHILS NFR BLD AUTO: 59.5 %
NONHDLC SERPL-MCNC: 131 MG/DL (ref ?–130)
OSMOLALITY SERPL CALC.SUM OF ELEC: 282 MOSM/KG (ref 275–295)
PATIENT FASTING Y/N/NP: YES
PATIENT FASTING Y/N/NP: YES
PLATELET # BLD AUTO: 370 10(3)UL (ref 150–450)
POTASSIUM SERPL-SCNC: 3.7 MMOL/L (ref 3.5–5.1)
RBC # BLD AUTO: 4.32 X10(6)UL (ref 3.8–5.3)
SODIUM SERPL-SCNC: 136 MMOL/L (ref 136–145)
TOTAL IRON BINDING CAPACITY: 460 UG/DL (ref 240–450)
TRANSFERRIN SERPL-MCNC: 309 MG/DL (ref 200–360)
TRIGL SERPL-MCNC: 89 MG/DL (ref 30–149)
VLDLC SERPL CALC-MCNC: 18 MG/DL (ref 0–30)
WBC # BLD AUTO: 5.8 X10(3) UL (ref 4–11)

## 2020-02-25 PROCEDURE — 85025 COMPLETE CBC W/AUTO DIFF WBC: CPT

## 2020-02-25 PROCEDURE — 83550 IRON BINDING TEST: CPT

## 2020-02-25 PROCEDURE — 83540 ASSAY OF IRON: CPT

## 2020-02-25 PROCEDURE — 80053 COMPREHEN METABOLIC PANEL: CPT

## 2020-02-25 PROCEDURE — 82728 ASSAY OF FERRITIN: CPT

## 2020-02-25 PROCEDURE — 80061 LIPID PANEL: CPT

## 2020-02-25 PROCEDURE — 36415 COLL VENOUS BLD VENIPUNCTURE: CPT

## 2020-02-25 RX ORDER — BUPROPION HYDROCHLORIDE 300 MG/1
300 TABLET ORAL DAILY
Qty: 90 TABLET | Refills: 1 | Status: SHIPPED | OUTPATIENT
Start: 2020-02-25 | End: 2020-07-07

## 2020-02-25 NOTE — TELEPHONE ENCOUNTER
LRF 12/2/19 #90  LOV 11/25/19  Future Appointments   Date Time Provider Jc Romo   2/25/2020  8:15 AM PF OOT PF CHEMO I Nani

## 2020-02-26 ENCOUNTER — APPOINTMENT (OUTPATIENT)
Dept: HEMATOLOGY/ONCOLOGY | Age: 55
End: 2020-02-26
Attending: INTERNAL MEDICINE
Payer: COMMERCIAL

## 2020-04-19 DIAGNOSIS — F41.9 ANXIETY: ICD-10-CM

## 2020-04-20 RX ORDER — BUSPIRONE HYDROCHLORIDE 7.5 MG/1
TABLET ORAL
Qty: 60 TABLET | Refills: 0 | Status: SHIPPED | OUTPATIENT
Start: 2020-04-20 | End: 2020-05-15

## 2020-04-20 NOTE — TELEPHONE ENCOUNTER
No refill protocol for this medication. Last refill: 3-  #90 with 0 refills  Last Visit: 11-  Next Visit: No future appointments. Forward to Dr. Will Stiles please advise on refills. Thanks.

## 2020-04-23 ENCOUNTER — PATIENT MESSAGE (OUTPATIENT)
Dept: FAMILY MEDICINE CLINIC | Facility: CLINIC | Age: 55
End: 2020-04-23

## 2020-04-23 ENCOUNTER — TELEMEDICINE (OUTPATIENT)
Dept: FAMILY MEDICINE CLINIC | Facility: CLINIC | Age: 55
End: 2020-04-23
Payer: COMMERCIAL

## 2020-04-23 DIAGNOSIS — J01.30 ACUTE NON-RECURRENT SPHENOIDAL SINUSITIS: Primary | ICD-10-CM

## 2020-04-23 PROCEDURE — 99213 OFFICE O/P EST LOW 20 MIN: CPT | Performed by: FAMILY MEDICINE

## 2020-04-23 RX ORDER — AMOXICILLIN AND CLAVULANATE POTASSIUM 875; 125 MG/1; MG/1
1 TABLET, FILM COATED ORAL 2 TIMES DAILY
Qty: 20 TABLET | Refills: 0 | Status: SHIPPED | OUTPATIENT
Start: 2020-04-23 | End: 2020-05-03

## 2020-04-23 NOTE — TELEPHONE ENCOUNTER
From: Jose Brody  To: Ishmael Tilley DO  Sent: 4/23/2020 8:18 AM CDT  Subject: Other    Good Morning Dr. Nacho Knowles,  I have had a bad sore throat since Monday night and know I have a few white bumps along with redness on the back of my throat.  I have post

## 2020-04-23 NOTE — PROGRESS NOTES
Visit for Respiratory Illness     This visit is conducted using Telemedicine with live, interactive video and audio.     SUBJECTIVE    Chief Complaint:  Concern for respiratory illness or strep throat (including COVID-19 and influenza)    HPI:   Jeanna Carter lips, mucosa, and tongue normal; teeth and gums normal and posterior oropharynx is red and irritated, but no or minimal tonsillar swelling that I can see. , Speaking in full sentences comfortably and Normal work of breathing. Normal affect.      ASSESSMENT

## 2020-05-15 DIAGNOSIS — F41.9 ANXIETY: ICD-10-CM

## 2020-05-15 RX ORDER — BUSPIRONE HYDROCHLORIDE 7.5 MG/1
TABLET ORAL
Qty: 60 TABLET | Refills: 0 | Status: SHIPPED | OUTPATIENT
Start: 2020-05-15 | End: 2020-06-07

## 2020-05-15 NOTE — TELEPHONE ENCOUNTER
BUSPIRONE HCL 7.5 MG Oral Tab    Last refilled: 4/20/20 - 60 tabs - 0 refills    Last OV: 4/23/20 - sinusitis -       No future appts. Please advise.

## 2020-06-06 DIAGNOSIS — F41.9 ANXIETY: ICD-10-CM

## 2020-06-06 RX ORDER — DULOXETIN HYDROCHLORIDE 60 MG/1
CAPSULE, DELAYED RELEASE ORAL
Qty: 60 CAPSULE | Refills: 5 | Status: SHIPPED | OUTPATIENT
Start: 2020-06-06 | End: 2020-12-07

## 2020-06-06 NOTE — TELEPHONE ENCOUNTER
Script sent. She has been stable on this for awhile. telemed visit in April. Well women exam in 11/19.

## 2020-06-07 DIAGNOSIS — F41.9 ANXIETY: ICD-10-CM

## 2020-06-07 RX ORDER — BUSPIRONE HYDROCHLORIDE 7.5 MG/1
TABLET ORAL
Qty: 60 TABLET | Refills: 0 | Status: SHIPPED | OUTPATIENT
Start: 2020-06-07 | End: 2020-07-07

## 2020-07-02 DIAGNOSIS — F41.9 ANXIETY: ICD-10-CM

## 2020-07-03 NOTE — TELEPHONE ENCOUNTER
Is she still taking this once daily? I realize we have been sending a script every month for #60, but she had said it works better once daily? Last script was 6/7/20 for #60.

## 2020-07-04 DIAGNOSIS — B00.2 ORAL HERPES: ICD-10-CM

## 2020-07-04 DIAGNOSIS — J01.30 ACUTE NON-RECURRENT SPHENOIDAL SINUSITIS: ICD-10-CM

## 2020-07-06 NOTE — TELEPHONE ENCOUNTER
Why does she want Augmentin? I can send the valacyclovir, but just want to make sure we are refilling the correct medication?

## 2020-07-07 RX ORDER — BUPROPION HYDROCHLORIDE 300 MG/1
300 TABLET ORAL DAILY
Qty: 90 TABLET | Refills: 1 | Status: SHIPPED | OUTPATIENT
Start: 2020-07-07 | End: 2020-12-17

## 2020-07-07 RX ORDER — AMOXICILLIN AND CLAVULANATE POTASSIUM 875; 125 MG/1; MG/1
TABLET, FILM COATED ORAL
Qty: 20 TABLET | Refills: 0 | OUTPATIENT
Start: 2020-07-07

## 2020-07-07 RX ORDER — VALACYCLOVIR HYDROCHLORIDE 1 G/1
2 TABLET, FILM COATED ORAL EVERY 12 HOURS SCHEDULED
Qty: 24 TABLET | Refills: 0 | OUTPATIENT
Start: 2020-07-07

## 2020-07-07 RX ORDER — BUSPIRONE HYDROCHLORIDE 7.5 MG/1
TABLET ORAL
Qty: 60 TABLET | Refills: 5 | Status: SHIPPED | OUTPATIENT
Start: 2020-07-07 | End: 2020-11-19

## 2020-07-07 NOTE — TELEPHONE ENCOUNTER
Patient states it depends on the day, some days by noon she needs to take the second tab. Also needs Bupropion.

## 2020-07-07 NOTE — TELEPHONE ENCOUNTER
Left message for patient to call office back. Office phone number provided. Patient has two refill encounters.

## 2020-08-22 DIAGNOSIS — B00.2 ORAL HERPES: ICD-10-CM

## 2020-08-22 RX ORDER — VALACYCLOVIR HYDROCHLORIDE 1 G/1
2 TABLET, FILM COATED ORAL EVERY 12 HOURS SCHEDULED
Qty: 24 TABLET | Refills: 0 | Status: SHIPPED | OUTPATIENT
Start: 2020-08-22 | End: 2020-09-16

## 2020-08-22 NOTE — TELEPHONE ENCOUNTER
No refill protocol for this medication. Last refill: 2/12/2020 #24 tabs with 0 refills  Last Visit: 4/23/2020 Telemed visit  Next Visit: No future appointments. Forward to Dr. Leola Cruz please advise on refills. Thanks.

## 2020-09-16 DIAGNOSIS — B00.2 ORAL HERPES: ICD-10-CM

## 2020-09-16 RX ORDER — VALACYCLOVIR HYDROCHLORIDE 1 G/1
2 TABLET, FILM COATED ORAL EVERY 12 HOURS SCHEDULED
Qty: 24 TABLET | Refills: 0 | Status: SHIPPED | OUTPATIENT
Start: 2020-09-16 | End: 2021-02-10

## 2020-10-22 DIAGNOSIS — I10 ESSENTIAL HYPERTENSION: ICD-10-CM

## 2020-10-22 RX ORDER — LISINOPRIL AND HYDROCHLOROTHIAZIDE 25; 20 MG/1; MG/1
TABLET ORAL
Qty: 30 TABLET | Refills: 11 | Status: SHIPPED | OUTPATIENT
Start: 2020-10-22 | End: 2021-07-12

## 2020-10-22 NOTE — TELEPHONE ENCOUNTER
Hypertension Medications Protocol Aanqwu49/22/2020 12:36 AM   Appointment in past 6 or next 3 months Protocol Details    CMP or BMP in past 12 months     Last serum creatinine< 2.0      Last refilled: 11/26/19 - 90 tabs - 3 refills    Last OV: 4/23/20 - te

## 2020-11-09 ENCOUNTER — OFFICE VISIT (OUTPATIENT)
Dept: FAMILY MEDICINE CLINIC | Facility: CLINIC | Age: 55
End: 2020-11-09
Payer: COMMERCIAL

## 2020-11-09 VITALS
WEIGHT: 141 LBS | TEMPERATURE: 98 F | DIASTOLIC BLOOD PRESSURE: 80 MMHG | BODY MASS INDEX: 24.98 KG/M2 | RESPIRATION RATE: 16 BRPM | HEIGHT: 63 IN | SYSTOLIC BLOOD PRESSURE: 124 MMHG | HEART RATE: 91 BPM | OXYGEN SATURATION: 98 %

## 2020-11-09 DIAGNOSIS — Z20.822 SUSPECTED 2019 NOVEL CORONAVIRUS INFECTION: Primary | ICD-10-CM

## 2020-11-09 PROCEDURE — 99213 OFFICE O/P EST LOW 20 MIN: CPT | Performed by: NURSE PRACTITIONER

## 2020-11-09 PROCEDURE — 99072 ADDL SUPL MATRL&STAF TM PHE: CPT | Performed by: NURSE PRACTITIONER

## 2020-11-09 PROCEDURE — 3074F SYST BP LT 130 MM HG: CPT | Performed by: NURSE PRACTITIONER

## 2020-11-09 PROCEDURE — 3079F DIAST BP 80-89 MM HG: CPT | Performed by: NURSE PRACTITIONER

## 2020-11-09 PROCEDURE — 3008F BODY MASS INDEX DOCD: CPT | Performed by: NURSE PRACTITIONER

## 2020-11-09 NOTE — PROGRESS NOTES
CHIEF COMPLAINT:   Patient presents with:  Cold: fatigue,loss of taste and smell. body aches x 1 day. HPI:   Lendel Habermann is a 48year old female who presents for upper respiratory symptoms for  3 days.  Patient reports fatigue, headache, congestio SKIN: no rashes or abnormal skin lesions  HEENT: See HPI  LUNGS: denies shortness of breath or wheezing, See HPI  CARDIOVASCULAR: denies chest pain or palpitations   GI: denies N/V/C or abdominal pain  NEURO: Denies headaches    EXAM:   /80 (BP Locat A viral illness may cause a number of symptoms such as fever. Other symptoms depend on the part of the body that the virus affects.  If it settles in your nose, throat, and lungs, it may cause cough, sore throat, congestion, runny nose, headache, earache an · Your appetite may be poor, so a light diet is fine. Avoid dehydration by drinking 8 to 12, 8-ounce glasses of fluids each day.  This may include water; orange juice; lemonade; apple, grape, and cranberry juice; clear fruit drinks; electrolyte replacement © 2306-7966 [employer] [address] [facility], [address] [place]. All rights reserved. This information is not intended as a substitute for professional medical care. Always follow your healthcare professional's instructions.             The

## 2020-11-09 NOTE — PATIENT INSTRUCTIONS
Viral Syndrome (Adult)  A viral illness may cause a number of symptoms such as fever. Other symptoms depend on the part of the body that the virus affects.  If it settles in your nose, throat, and lungs, it may cause cough, sore throat, congestion, runny · Your appetite may be poor, so a light diet is fine. Avoid dehydration by drinking 8 to 12, 8-ounce glasses of fluids each day.  This may include water; orange juice; lemonade; apple, grape, and cranberry juice; clear fruit drinks; electrolyte replacement © 8547-3285 [employer] [address] [facility], [facility] [place]. All rights reserved. This information is not intended as a substitute for professional medical care. Always follow your healthcare professional's instructions.

## 2020-11-19 ENCOUNTER — TELEMEDICINE (OUTPATIENT)
Dept: FAMILY MEDICINE CLINIC | Facility: CLINIC | Age: 55
End: 2020-11-19
Payer: COMMERCIAL

## 2020-11-19 DIAGNOSIS — R07.89 CHEST TIGHTNESS: ICD-10-CM

## 2020-11-19 DIAGNOSIS — F41.9 ANXIETY: ICD-10-CM

## 2020-11-19 DIAGNOSIS — J01.10 ACUTE NON-RECURRENT FRONTAL SINUSITIS: Primary | ICD-10-CM

## 2020-11-19 PROCEDURE — 99214 OFFICE O/P EST MOD 30 MIN: CPT | Performed by: FAMILY MEDICINE

## 2020-11-19 RX ORDER — BUSPIRONE HYDROCHLORIDE 7.5 MG/1
7.5 TABLET ORAL 3 TIMES DAILY
Qty: 90 TABLET | Refills: 5 | Status: SHIPPED | OUTPATIENT
Start: 2020-11-19 | End: 2021-05-03

## 2020-11-19 RX ORDER — AMOXICILLIN AND CLAVULANATE POTASSIUM 875; 125 MG/1; MG/1
1 TABLET, FILM COATED ORAL 2 TIMES DAILY
Qty: 20 TABLET | Refills: 0 | Status: SHIPPED | OUTPATIENT
Start: 2020-11-19 | End: 2020-11-29

## 2020-11-19 RX ORDER — ALBUTEROL SULFATE 90 UG/1
2 AEROSOL, METERED RESPIRATORY (INHALATION) EVERY 6 HOURS PRN
Qty: 1 INHALER | Refills: 0 | Status: SHIPPED | OUTPATIENT
Start: 2020-11-19 | End: 2021-07-26

## 2020-11-19 NOTE — PROGRESS NOTES
This is a telemedicine visit with live, interactive video and audio. Patient understands and accepts financial responsibility for any deductible, co-insurance and/or co-pays associated with this service.     SUBJECTIVE  Diagnosed with COVID last Wednesd Medication Sig Dispense Refill   • Amoxicillin-Pot Clavulanate 875-125 MG Oral Tab Take 1 tablet by mouth 2 (two) times daily for 10 days.  20 tablet 0   • Albuterol Sulfate  (90 Base) MCG/ACT Inhalation Aero Soln Inhale 2 puffs into the lungs ever file for meds. Will increase buspar, as she is taking 3 per day typically. Call if worsening COVID symptoms, will treat for sinus infection. Albuterol as needed.      Tuan Coulter, DO    Please note that the following visit was completed using two-way,

## 2020-12-03 ENCOUNTER — TELEPHONE (OUTPATIENT)
Dept: FAMILY MEDICINE CLINIC | Facility: CLINIC | Age: 55
End: 2020-12-03

## 2020-12-05 DIAGNOSIS — F41.9 ANXIETY: ICD-10-CM

## 2020-12-05 NOTE — TELEPHONE ENCOUNTER
Routing to provider per protocol. Last refilled on 6/6/20 for # 60 with 5 rf. Last seen on 11/19/20. No future appointments. Thank you.

## 2020-12-07 RX ORDER — DULOXETIN HYDROCHLORIDE 60 MG/1
CAPSULE, DELAYED RELEASE ORAL
Qty: 60 CAPSULE | Refills: 5 | Status: SHIPPED | OUTPATIENT
Start: 2020-12-07 | End: 2021-06-04

## 2020-12-17 DIAGNOSIS — F41.9 ANXIETY: ICD-10-CM

## 2020-12-17 RX ORDER — BUPROPION HYDROCHLORIDE 300 MG/1
TABLET ORAL
Qty: 30 TABLET | Refills: 5 | Status: SHIPPED | OUTPATIENT
Start: 2020-12-17 | End: 2021-06-04

## 2020-12-17 NOTE — TELEPHONE ENCOUNTER
No refill protocol for this medication. Last refill: 7- #90 with 1 refill  Last Visit: 11- Telemed  Next Visit: No future appointments. Forward to Dr. Manny King please advise on refills. Thanks.

## 2021-01-26 ENCOUNTER — PATIENT MESSAGE (OUTPATIENT)
Dept: FAMILY MEDICINE CLINIC | Facility: CLINIC | Age: 56
End: 2021-01-26

## 2021-01-26 NOTE — TELEPHONE ENCOUNTER
From: Oni Hyman  To: Kalia Herrera DO  Sent: 1/26/2021 8:21 AM CST  Subject: Non-Urgent Cassandra Navas,    I have been having bad headaches, runny nose, congestion and bloody noses since I had Covid.  I feel like I might have anothe

## 2021-01-28 ENCOUNTER — TELEMEDICINE (OUTPATIENT)
Dept: FAMILY MEDICINE CLINIC | Facility: CLINIC | Age: 56
End: 2021-01-28
Payer: COMMERCIAL

## 2021-01-28 DIAGNOSIS — R43.0 LOSS OF SMELL: ICD-10-CM

## 2021-01-28 DIAGNOSIS — J32.1 CHRONIC FRONTAL SINUSITIS: Primary | ICD-10-CM

## 2021-01-28 DIAGNOSIS — R43.2 LOSS OF TASTE: ICD-10-CM

## 2021-01-28 PROCEDURE — 99214 OFFICE O/P EST MOD 30 MIN: CPT | Performed by: FAMILY MEDICINE

## 2021-01-28 RX ORDER — CEFDINIR 300 MG/1
300 CAPSULE ORAL 2 TIMES DAILY
Qty: 20 CAPSULE | Refills: 0 | Status: SHIPPED | OUTPATIENT
Start: 2021-01-28 | End: 2021-02-07

## 2021-01-28 NOTE — PROGRESS NOTES
This is a telemedicine visit with live, interactive video and audio. Patient understands and accepts financial responsibility for any deductible, co-insurance and/or co-pays associated with this service. SUBJECTIVE  Had COVID in November.    Since th Therapy Pack As directed. 1 kit 0   • Albuterol Sulfate  (90 Base) MCG/ACT Inhalation Aero Soln Inhale 2 puffs into the lungs every 6 (six) hours as needed for Shortness of Breath.  1 Inhaler 0   • busPIRone HCl 7.5 MG Oral Tab Take 1 tablet (7.5 mg limitations of this visit as no physical exam could be performed. Every conscious effort was taken to allow for sufficient and adequate time. This billing was spent on reviewing labs, medications, radiology tests and decision making.   Appropriate medical

## 2021-02-09 DIAGNOSIS — B00.2 ORAL HERPES: ICD-10-CM

## 2021-02-10 RX ORDER — VALACYCLOVIR HYDROCHLORIDE 1 G/1
TABLET, FILM COATED ORAL
Qty: 24 TABLET | Refills: 0 | Status: SHIPPED | OUTPATIENT
Start: 2021-02-10

## 2021-04-05 ENCOUNTER — TELEPHONE (OUTPATIENT)
Dept: FAMILY MEDICINE CLINIC | Facility: CLINIC | Age: 56
End: 2021-04-05

## 2021-05-02 DIAGNOSIS — B00.2 ORAL HERPES: ICD-10-CM

## 2021-05-02 DIAGNOSIS — F41.9 ANXIETY: ICD-10-CM

## 2021-05-03 RX ORDER — VALACYCLOVIR HYDROCHLORIDE 500 MG/1
500 TABLET, FILM COATED ORAL DAILY
Qty: 30 TABLET | Refills: 0 | Status: SHIPPED | OUTPATIENT
Start: 2021-05-03 | End: 2021-07-06

## 2021-05-03 RX ORDER — BUSPIRONE HYDROCHLORIDE 7.5 MG/1
7.5 TABLET ORAL 3 TIMES DAILY
Qty: 90 TABLET | Refills: 0 | Status: SHIPPED | OUTPATIENT
Start: 2021-05-03 | End: 2021-05-27

## 2021-05-03 NOTE — TELEPHONE ENCOUNTER
Last OV 1/28/21  Telemed, 11/25/19 office  Last refilled:  2/10/21 Valacyclovir  #24  0 refills  11/19/2020  Buspirone #90  5 refills    No future appointments.

## 2021-05-26 DIAGNOSIS — B00.2 ORAL HERPES: ICD-10-CM

## 2021-05-26 RX ORDER — VALACYCLOVIR HYDROCHLORIDE 500 MG/1
500 TABLET, FILM COATED ORAL DAILY
Qty: 30 TABLET | Refills: 0 | OUTPATIENT
Start: 2021-05-26

## 2021-05-27 DIAGNOSIS — F41.9 ANXIETY: ICD-10-CM

## 2021-05-27 RX ORDER — BUSPIRONE HYDROCHLORIDE 7.5 MG/1
7.5 TABLET ORAL 3 TIMES DAILY
Qty: 90 TABLET | Refills: 0 | Status: SHIPPED | OUTPATIENT
Start: 2021-05-27 | End: 2021-07-09

## 2021-06-04 DIAGNOSIS — F41.9 ANXIETY: ICD-10-CM

## 2021-06-04 RX ORDER — BUPROPION HYDROCHLORIDE 300 MG/1
300 TABLET ORAL DAILY
Qty: 30 TABLET | Refills: 0 | Status: SHIPPED | OUTPATIENT
Start: 2021-06-04 | End: 2021-06-29

## 2021-06-04 RX ORDER — DULOXETIN HYDROCHLORIDE 60 MG/1
120 CAPSULE, DELAYED RELEASE ORAL DAILY
Qty: 60 CAPSULE | Refills: 5 | Status: SHIPPED | OUTPATIENT
Start: 2021-06-04 | End: 2021-12-02

## 2021-06-25 DIAGNOSIS — F41.9 ANXIETY: ICD-10-CM

## 2021-06-25 RX ORDER — BUSPIRONE HYDROCHLORIDE 7.5 MG/1
7.5 TABLET ORAL 3 TIMES DAILY
Qty: 90 TABLET | Refills: 0 | OUTPATIENT
Start: 2021-06-25

## 2021-06-29 DIAGNOSIS — F41.9 ANXIETY: ICD-10-CM

## 2021-06-29 RX ORDER — BUPROPION HYDROCHLORIDE 300 MG/1
TABLET ORAL
Qty: 30 TABLET | Refills: 0 | Status: SHIPPED | OUTPATIENT
Start: 2021-06-29 | End: 2021-07-12

## 2021-06-29 NOTE — TELEPHONE ENCOUNTER
Last OV 1/28/21  Last refilled 6/4/21  #30  0 refills   Notified via 5/2/21, 5/26/21 and 6/4/21 refill she is due for appointment. No future appointments.      Patient notified via detailed voicemail left at cell number (ok per  HIPAA consent) that she is

## 2021-06-29 NOTE — TELEPHONE ENCOUNTER
Future Appointments   Date Time Provider Jc Clarissa   7/12/2021  3:00 PM DO BRUCE James     Routed to  to advise on refill

## 2021-07-05 DIAGNOSIS — B00.2 ORAL HERPES: ICD-10-CM

## 2021-07-06 RX ORDER — VALACYCLOVIR HYDROCHLORIDE 500 MG/1
500 TABLET, FILM COATED ORAL DAILY
Qty: 30 TABLET | Refills: 0 | Status: SHIPPED | OUTPATIENT
Start: 2021-07-06 | End: 2021-07-12

## 2021-07-06 NOTE — TELEPHONE ENCOUNTER
Last OV 1/28/21 telemed,   11/9/2020 office  Future Appointments   Date Time Provider cJ Clarissa   7/12/2021  3:00 PM DO BRUCE Lyons [facility]      routed to KE to advise if ok for another 30 day script

## 2021-07-09 DIAGNOSIS — F41.9 ANXIETY: ICD-10-CM

## 2021-07-09 RX ORDER — BUSPIRONE HYDROCHLORIDE 7.5 MG/1
7.5 TABLET ORAL 3 TIMES DAILY
Qty: 90 TABLET | Refills: 0 | Status: SHIPPED | OUTPATIENT
Start: 2021-07-09 | End: 2021-07-12

## 2021-07-09 NOTE — TELEPHONE ENCOUNTER
Routing to provider per protocol. BUSPIRONE HCL 7.5 MG Oral Tab    Last refilled on 5/27/21 for #90  with 0 rf. Last labs 2/25/20. Last seen on 1/28/21 telemedicine.      Future Appointments   Date Time Provider Jc Romo   7/12/2021  3:00 PM

## 2021-07-12 ENCOUNTER — OFFICE VISIT (OUTPATIENT)
Dept: FAMILY MEDICINE CLINIC | Facility: CLINIC | Age: 56
End: 2021-07-12
Payer: COMMERCIAL

## 2021-07-12 VITALS
SYSTOLIC BLOOD PRESSURE: 138 MMHG | HEIGHT: 63 IN | BODY MASS INDEX: 26.75 KG/M2 | OXYGEN SATURATION: 97 % | HEART RATE: 84 BPM | RESPIRATION RATE: 16 BRPM | TEMPERATURE: 98 F | DIASTOLIC BLOOD PRESSURE: 90 MMHG | WEIGHT: 151 LBS

## 2021-07-12 DIAGNOSIS — R87.622 LOW GRADE SQUAMOUS INTRAEPITH LESION ON CYTOLOGIC SMEAR VAGINA (LGSIL): ICD-10-CM

## 2021-07-12 DIAGNOSIS — F41.9 ANXIETY: ICD-10-CM

## 2021-07-12 DIAGNOSIS — I10 ESSENTIAL HYPERTENSION: ICD-10-CM

## 2021-07-12 DIAGNOSIS — B00.2 ORAL HERPES: ICD-10-CM

## 2021-07-12 DIAGNOSIS — R79.82 ELEVATED C-REACTIVE PROTEIN (CRP): ICD-10-CM

## 2021-07-12 DIAGNOSIS — Z12.31 ENCOUNTER FOR SCREENING MAMMOGRAM FOR MALIGNANT NEOPLASM OF BREAST: ICD-10-CM

## 2021-07-12 DIAGNOSIS — Z12.11 SCREENING FOR COLON CANCER: ICD-10-CM

## 2021-07-12 DIAGNOSIS — Z86.2 HISTORY OF AUTOIMMUNE DISORDER: ICD-10-CM

## 2021-07-12 DIAGNOSIS — Z00.00 HEALTHY ADULT ON ROUTINE PHYSICAL EXAMINATION: Primary | ICD-10-CM

## 2021-07-12 DIAGNOSIS — R63.5 WEIGHT GAIN: ICD-10-CM

## 2021-07-12 LAB
ALBUMIN SERPL-MCNC: 3.6 G/DL (ref 3.4–5)
ALBUMIN/GLOB SERPL: 0.9 {RATIO} (ref 1–2)
ALP LIVER SERPL-CCNC: 95 U/L
ALT SERPL-CCNC: 32 U/L
ANION GAP SERPL CALC-SCNC: 5 MMOL/L (ref 0–18)
AST SERPL-CCNC: 27 U/L (ref 15–37)
BASOPHILS # BLD AUTO: 0.04 X10(3) UL (ref 0–0.2)
BASOPHILS NFR BLD AUTO: 0.7 %
BILIRUB SERPL-MCNC: 0.3 MG/DL (ref 0.1–2)
BUN BLD-MCNC: 11 MG/DL (ref 7–18)
BUN/CREAT SERPL: 14.5 (ref 10–20)
CALCIUM BLD-MCNC: 8.8 MG/DL (ref 8.5–10.1)
CHLORIDE SERPL-SCNC: 98 MMOL/L (ref 98–112)
CHOLEST SMN-MCNC: 263 MG/DL (ref ?–200)
CO2 SERPL-SCNC: 29 MMOL/L (ref 21–32)
CREAT BLD-MCNC: 0.76 MG/DL
CRP SERPL-MCNC: <0.29 MG/DL (ref ?–0.3)
DEPRECATED RDW RBC AUTO: 44.7 FL (ref 35.1–46.3)
EOSINOPHIL # BLD AUTO: 0.23 X10(3) UL (ref 0–0.7)
EOSINOPHIL NFR BLD AUTO: 3.9 %
ERYTHROCYTE [DISTWIDTH] IN BLOOD BY AUTOMATED COUNT: 13 % (ref 11–15)
GLOBULIN PLAS-MCNC: 3.8 G/DL (ref 2.8–4.4)
GLUCOSE BLD-MCNC: 93 MG/DL (ref 70–99)
HCT VFR BLD AUTO: 37.1 %
HDLC SERPL-MCNC: 108 MG/DL (ref 40–59)
HGB BLD-MCNC: 12.2 G/DL
IMM GRANULOCYTES # BLD AUTO: 0.01 X10(3) UL (ref 0–1)
IMM GRANULOCYTES NFR BLD: 0.2 %
LDLC SERPL CALC-MCNC: 132 MG/DL (ref ?–100)
LYMPHOCYTES # BLD AUTO: 1.62 X10(3) UL (ref 1–4)
LYMPHOCYTES NFR BLD AUTO: 27.4 %
M PROTEIN MFR SERPL ELPH: 7.4 G/DL (ref 6.4–8.2)
MCH RBC QN AUTO: 31 PG (ref 26–34)
MCHC RBC AUTO-ENTMCNC: 32.9 G/DL (ref 31–37)
MCV RBC AUTO: 94.2 FL
MONOCYTES # BLD AUTO: 0.69 X10(3) UL (ref 0.1–1)
MONOCYTES NFR BLD AUTO: 11.7 %
NEUTROPHILS # BLD AUTO: 3.33 X10 (3) UL (ref 1.5–7.7)
NEUTROPHILS # BLD AUTO: 3.33 X10(3) UL (ref 1.5–7.7)
NEUTROPHILS NFR BLD AUTO: 56.1 %
NONHDLC SERPL-MCNC: 155 MG/DL (ref ?–130)
OSMOLALITY SERPL CALC.SUM OF ELEC: 273 MOSM/KG (ref 275–295)
PATIENT FASTING Y/N/NP: NO
PATIENT FASTING Y/N/NP: NO
PLATELET # BLD AUTO: 348 10(3)UL (ref 150–450)
POTASSIUM SERPL-SCNC: 3.8 MMOL/L (ref 3.5–5.1)
RBC # BLD AUTO: 3.94 X10(6)UL
SODIUM SERPL-SCNC: 132 MMOL/L (ref 136–145)
TRIGL SERPL-MCNC: 135 MG/DL (ref 30–149)
TSI SER-ACNC: 0.83 MIU/ML (ref 0.36–3.74)
VLDLC SERPL CALC-MCNC: 24 MG/DL (ref 0–30)
WBC # BLD AUTO: 5.9 X10(3) UL (ref 4–11)

## 2021-07-12 PROCEDURE — 99396 PREV VISIT EST AGE 40-64: CPT | Performed by: FAMILY MEDICINE

## 2021-07-12 PROCEDURE — 86140 C-REACTIVE PROTEIN: CPT | Performed by: FAMILY MEDICINE

## 2021-07-12 PROCEDURE — 3075F SYST BP GE 130 - 139MM HG: CPT | Performed by: FAMILY MEDICINE

## 2021-07-12 PROCEDURE — 80061 LIPID PANEL: CPT | Performed by: FAMILY MEDICINE

## 2021-07-12 PROCEDURE — 3080F DIAST BP >= 90 MM HG: CPT | Performed by: FAMILY MEDICINE

## 2021-07-12 PROCEDURE — 3008F BODY MASS INDEX DOCD: CPT | Performed by: FAMILY MEDICINE

## 2021-07-12 PROCEDURE — 80050 GENERAL HEALTH PANEL: CPT | Performed by: FAMILY MEDICINE

## 2021-07-12 RX ORDER — VALACYCLOVIR HYDROCHLORIDE 500 MG/1
500 TABLET, FILM COATED ORAL DAILY
Qty: 30 TABLET | Refills: 11 | Status: SHIPPED | OUTPATIENT
Start: 2021-07-12

## 2021-07-12 RX ORDER — BUSPIRONE HYDROCHLORIDE 7.5 MG/1
7.5 TABLET ORAL 3 TIMES DAILY
Qty: 90 TABLET | Refills: 11 | Status: SHIPPED | OUTPATIENT
Start: 2021-07-12 | End: 2022-01-05

## 2021-07-12 RX ORDER — BUPROPION HYDROCHLORIDE 300 MG/1
300 TABLET ORAL DAILY
Qty: 30 TABLET | Refills: 11 | Status: SHIPPED | OUTPATIENT
Start: 2021-07-12 | End: 2021-11-03

## 2021-07-12 RX ORDER — LISINOPRIL AND HYDROCHLOROTHIAZIDE 25; 20 MG/1; MG/1
1 TABLET ORAL DAILY
Qty: 30 TABLET | Refills: 11 | Status: SHIPPED | OUTPATIENT
Start: 2021-07-12

## 2021-07-12 NOTE — PROGRESS NOTES
HPI:   Omaira Mckeon is a 48year old female who presents for a complete physical exam. Symptoms: denies discharge, itching, burning or dysuria, is S/P DILAN, ovaries preserved. Patient complains of issues below. Eating more, appetite increased.  Warm a mg total) by mouth daily. 30 tablet 11   • busPIRone HCl 7.5 MG Oral Tab Take 1 tablet (7.5 mg total) by mouth 3 (three) times daily. Due for appointment before next refill.  Please call [phone] to schedule 90 tablet 11   • Lisinopril-hydroCHLOROthiazi file    Occ: working from home. : no. Children: 2 boys as above.    Exercise: minimal.  Diet: watches minimally     REVIEW OF SYSTEMS:   GENERAL: feels well otherwise  SKIN: denies any unusual skin lesions  EYES:denies blurred vision or double vision weight is Body mass index is 26.75 kg/m². , recommended low fat diet and aerobic exercise 30 minutes three times weekly. The patient indicates understanding of these issues and agrees to the plan.   The patient is asked to return for CPX in 1 yr or sooner i

## 2021-07-21 ENCOUNTER — TELEPHONE (OUTPATIENT)
Dept: OBGYN CLINIC | Facility: CLINIC | Age: 56
End: 2021-07-21

## 2021-07-21 NOTE — TELEPHONE ENCOUNTER
Pt scheduled    Future Appointments   Date Time Provider Jc Romo   8/11/2021  2:00 PM Marshall Fam MD [facility] [address]   9/1/2021 11:00 AM Lina Givens MD [id number] [address]

## 2021-07-21 NOTE — TELEPHONE ENCOUNTER
Patient was referred to us for abnormal pap. Result in epic please advise.  However it was in      Thank you

## 2021-07-26 ENCOUNTER — OFFICE VISIT (OUTPATIENT)
Dept: FAMILY MEDICINE CLINIC | Facility: CLINIC | Age: 56
End: 2021-07-26
Payer: COMMERCIAL

## 2021-07-26 VITALS
WEIGHT: 147 LBS | DIASTOLIC BLOOD PRESSURE: 94 MMHG | HEART RATE: 97 BPM | RESPIRATION RATE: 16 BRPM | BODY MASS INDEX: 26.05 KG/M2 | SYSTOLIC BLOOD PRESSURE: 156 MMHG | HEIGHT: 63 IN | OXYGEN SATURATION: 99 % | TEMPERATURE: 99 F

## 2021-07-26 DIAGNOSIS — J02.0 STREP PHARYNGITIS: ICD-10-CM

## 2021-07-26 DIAGNOSIS — R07.89 CHEST TIGHTNESS: ICD-10-CM

## 2021-07-26 DIAGNOSIS — R03.0 ELEVATED BLOOD PRESSURE READING: ICD-10-CM

## 2021-07-26 DIAGNOSIS — J02.9 SORE THROAT: Primary | ICD-10-CM

## 2021-07-26 LAB
CONTROL LINE PRESENT WITH A CLEAR BACKGROUND (YES/NO): YES YES/NO
KIT LOT #: ABNORMAL NUMERIC
STREP GRP A CUL-SCR: POSITIVE

## 2021-07-26 PROCEDURE — 99213 OFFICE O/P EST LOW 20 MIN: CPT | Performed by: NURSE PRACTITIONER

## 2021-07-26 PROCEDURE — 3077F SYST BP >= 140 MM HG: CPT | Performed by: NURSE PRACTITIONER

## 2021-07-26 PROCEDURE — 3008F BODY MASS INDEX DOCD: CPT | Performed by: NURSE PRACTITIONER

## 2021-07-26 PROCEDURE — 3080F DIAST BP >= 90 MM HG: CPT | Performed by: NURSE PRACTITIONER

## 2021-07-26 PROCEDURE — 87880 STREP A ASSAY W/OPTIC: CPT | Performed by: NURSE PRACTITIONER

## 2021-07-26 RX ORDER — AMOXICILLIN 500 MG/1
500 CAPSULE ORAL 2 TIMES DAILY
Qty: 20 CAPSULE | Refills: 0 | Status: SHIPPED | OUTPATIENT
Start: 2021-07-26 | End: 2021-08-05

## 2021-07-26 RX ORDER — ALBUTEROL SULFATE 90 UG/1
2 AEROSOL, METERED RESPIRATORY (INHALATION) EVERY 6 HOURS PRN
Qty: 2 EACH | Refills: 1 | Status: SHIPPED | OUTPATIENT
Start: 2021-07-26

## 2021-07-26 NOTE — TELEPHONE ENCOUNTER
Asthma & COPD Medication Protocol [id number]/26/2021 01:08 PM   Asthma Action Score greater than or equal to 20    AAP/ACT given in last 12 months    Appointment in past 6 or next 3 months      Routing to provider per protocol.    Albuterol Sulfate  (9

## 2021-07-26 NOTE — PROGRESS NOTES
CHIEF COMPLAINT:   Patient presents with:  Sore Throat        HPI:   Aron Brunner is a 48year old female presents to clinic with complaint of sore throat. Patient has had 3 days. Symptoms have been worsening since onset.   Patient reports following as not taking: Reported on 7/26/2021 ) 5 g 1      Past Medical History:   Diagnosis Date   • Antiphospholipid antibody syndrome (HCC)    • Anxiety    • Clotting disorder ([facility]    • Depression    • Esophageal reflux    • SLE (systemic lupus erythematosus) ([facility]. Present with a clear background (yes/no) yes Yes/No    Kit Lot # [id number] Numeric    Kit Expiration Date 7/31/22 Date         ASSESSMENT AND PLAN:   Assessment:   Sore throat  (primary encounter diagnosis)  Elevated blood pressure reading  Strep pharyngitis any more antibiotics. · You may use acetaminophen or ibuprofen to control pain or fever, unless another medicine was prescribed for this.  Talk with your healthcare provider before taking these medicines if you have chronic liver or kidney disease or if yo healthcare professional's instructions.

## 2021-07-28 ENCOUNTER — PATIENT MESSAGE (OUTPATIENT)
Dept: FAMILY MEDICINE CLINIC | Facility: CLINIC | Age: 56
End: 2021-07-28

## 2021-07-28 NOTE — TELEPHONE ENCOUNTER
Only tested for strep on Monday which was positive- would you like pt to go back to [facility] or IC to be further evaluated?

## 2021-07-28 NOTE — TELEPHONE ENCOUNTER
Pt was advised of note below- verbalized understanding    Pt states she will got to IC to be evaulated

## 2021-07-28 NOTE — TELEPHONE ENCOUNTER
She should not have respiratory issues with strep, I'm thinking maybe she should go to the IC and get checked out , w don;t have xray here today and she may need a COVID swab as well

## 2021-07-28 NOTE — TELEPHONE ENCOUNTER
From: Oni Hyman  To: Kalia Herrera DO  Sent: 7/28/2021 11:50 AM CDT  Subject: Visit Follow-up Question    Hi Dr. Heidi Navas,    I started the Amoxicillin on Monday evening and I have now had 2 days of full doses as of this am, I have a few questions to m

## 2021-07-28 NOTE — TELEPHONE ENCOUNTER
YES IC, not walk in, was that not in my note? She may need a CXR as well, we have no xray here.  I saw her strep was positive, and no covid was done

## 2021-08-12 ENCOUNTER — TELEPHONE (OUTPATIENT)
Dept: FAMILY MEDICINE CLINIC | Facility: CLINIC | Age: 56
End: 2021-08-12

## 2021-10-13 ENCOUNTER — HOSPITAL ENCOUNTER (EMERGENCY)
Age: 56
Discharge: HOME | End: 2021-10-13
Attending: EMERGENCY MEDICINE
Payer: COMMERCIAL

## 2021-10-13 ENCOUNTER — APPOINTMENT (OUTPATIENT)
Dept: GENERAL RADIOLOGY | Age: 56
End: 2021-10-13
Attending: EMERGENCY MEDICINE
Payer: COMMERCIAL

## 2021-10-13 VITALS
OXYGEN SATURATION: 100 % | BODY MASS INDEX: 25.16 KG/M2 | HEART RATE: 99 BPM | TEMPERATURE: 97 F | HEIGHT: 63 IN | RESPIRATION RATE: 16 BRPM | DIASTOLIC BLOOD PRESSURE: 92 MMHG | WEIGHT: 142 LBS | SYSTOLIC BLOOD PRESSURE: 158 MMHG

## 2021-10-13 DIAGNOSIS — S96.912A STRAIN OF LEFT FOOT, INITIAL ENCOUNTER: Primary | ICD-10-CM

## 2021-10-13 PROCEDURE — 99283 EMERGENCY DEPT VISIT LOW MDM: CPT

## 2021-10-13 PROCEDURE — 73630 X-RAY EXAM OF FOOT: CPT | Performed by: EMERGENCY MEDICINE
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
## 2021-10-13 NOTE — ED PROVIDER NOTES
Patient Seen in: THE [facility] In [place]      History   Patient presents with:   Foot Pain    Stated Complaint: left foot pain     Subjective:   HPI    Patient is a 15-year-old female presents emergency room with a history of left foot p Current:BP (!) 158/92   Pulse 99   Temp 97.2 °F (36.2 °C) (Temporal)   Resp 16   Ht 160 cm (5' 3\")   Wt 64.4 kg   SpO2 100%   BMI 25.15 kg/m²         Physical Exam  GENERAL: Well-developed, well-nourished female sitting up breathing easily in no ariel discharged home at this time.                              Disposition and Plan     Clinical Impression:  Strain of left foot, initial encounter  (primary encounter diagnosis)     Disposition:  Discharge  10/13/2021 10:46 am    Follow-up:  Kassidy Roper

## 2021-10-22 ENCOUNTER — TELEPHONE (OUTPATIENT)
Dept: FAMILY MEDICINE CLINIC | Facility: CLINIC | Age: 56
End: 2021-10-22

## 2021-11-03 ENCOUNTER — OFFICE VISIT (OUTPATIENT)
Dept: RHEUMATOLOGY | Facility: CLINIC | Age: 56
End: 2021-11-03
Payer: COMMERCIAL

## 2021-11-03 VITALS
OXYGEN SATURATION: 99 % | WEIGHT: 145 LBS | SYSTOLIC BLOOD PRESSURE: 141 MMHG | HEIGHT: 63 IN | HEART RATE: 92 BPM | BODY MASS INDEX: 25.69 KG/M2 | DIASTOLIC BLOOD PRESSURE: 90 MMHG

## 2021-11-03 DIAGNOSIS — R79.0 LOW FERRITIN: ICD-10-CM

## 2021-11-03 DIAGNOSIS — R76.0 LUPUS ANTICOAGULANT POSITIVE: ICD-10-CM

## 2021-11-03 DIAGNOSIS — Z51.81 THERAPEUTIC DRUG MONITORING: ICD-10-CM

## 2021-11-03 DIAGNOSIS — R68.2 DRY MOUTH: ICD-10-CM

## 2021-11-03 DIAGNOSIS — R22.42 LOCALIZED SWELLING OF LEFT FOOT: ICD-10-CM

## 2021-11-03 DIAGNOSIS — R76.8 POSITIVE ANA (ANTINUCLEAR ANTIBODY): Primary | ICD-10-CM

## 2021-11-03 PROCEDURE — 3077F SYST BP >= 140 MM HG: CPT | Performed by: INTERNAL MEDICINE

## 2021-11-03 PROCEDURE — 99244 OFF/OP CNSLTJ NEW/EST MOD 40: CPT | Performed by: INTERNAL MEDICINE

## 2021-11-03 PROCEDURE — 3008F BODY MASS INDEX DOCD: CPT | Performed by: INTERNAL MEDICINE

## 2021-11-03 PROCEDURE — 3080F DIAST BP >= 90 MM HG: CPT | Performed by: INTERNAL MEDICINE

## 2021-11-03 RX ORDER — PILOCARPINE HYDROCHLORIDE 5 MG/1
5 TABLET, FILM COATED ORAL EVERY EVENING
Qty: 90 TABLET | Refills: 1 | Status: SHIPPED | OUTPATIENT
Start: 2021-11-03

## 2021-11-03 NOTE — PROGRESS NOTES
Rheumatology New Patient Note  =====================================================================================================    Date of visit: 11/3/2021  ? Patient presents with:  Establish Care: New pt, referred by PCP.  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      Denies current  malar rash, photosensitivity rash, discoid lesions, oral/nasal ulcers, pleuritic chest pain, arthritis, seizures/psychosis, miscarriages or obstetric events (e HYSTERECTOMY       Family History:  Family History   Problem Relation Age of Onset   • Other (Other) Father         hepatitis- EtOH   • Other (rheumatoid arthritis) Maternal Grandmother    • Ovarian Cancer Paternal Grandmother    • Breast Cancer Maternal A increased warmth. AROM flexion/extension ~0-180 degrees. No valgus/varus laxities appreciated. Ankles/Feet- No swelling, erythema, or increased warmth. Left foot with dorsal soft tissue swelling. Tender to palpation.     ?  Labs:  Lab Results   Com mg/L  Sed rate 18     11/2015  LAC by DRVVT and STACLOT was positive x 1 only.     anticardiolipin IgG/, beta-2 glycoprotein IgG/IgM  Anticardiolipin \"low positive \"which equals 20-30 MPU    5/2012  Lupus inhibitor positiveanticardiolipin IgG/IgM, beta-2 mg twice daily.   -voltaren gel for left foot   - for dry eyes, try OTC eye drops: Systane, Refresh or Blink  -- for dry mouth, try biotene products (toothpaste, mouthwash, oral spray or lozenges)  Your blood pressure was elevated during your office visit utilizing speech recognition software technology. Despite proofreading, speech recognition errors could escape detection. If a word or phrase is confusing or out of context, please do not hesitate to call for   clarification.        Kind regards      Ashley Shepard

## 2021-11-03 NOTE — PATIENT INSTRUCTIONS
Turmeric for arthritis: take 1000 mg twice daily.     ==============================================================================================================    The following website has some tips on anti-inflammatory diet and articles on differen wet food or heat food in microwave     ==============================================================================================================      Purchase Voltaren (diclofenac) 1% gel over-the-counter. It is in an orange packaging.      Total dose http://wise.org/

## 2021-11-17 ENCOUNTER — LAB ENCOUNTER (OUTPATIENT)
Dept: LAB | Age: 56
End: 2021-11-17
Attending: INTERNAL MEDICINE
Payer: COMMERCIAL

## 2021-11-17 DIAGNOSIS — Z51.81 THERAPEUTIC DRUG MONITORING: ICD-10-CM

## 2021-11-17 DIAGNOSIS — R79.0 LOW FERRITIN: ICD-10-CM

## 2021-11-17 DIAGNOSIS — R76.0 LUPUS ANTICOAGULANT POSITIVE: ICD-10-CM

## 2021-11-17 DIAGNOSIS — R76.8 POSITIVE ANA (ANTINUCLEAR ANTIBODY): Primary | ICD-10-CM

## 2021-12-02 DIAGNOSIS — F41.9 ANXIETY: ICD-10-CM

## 2021-12-02 RX ORDER — DULOXETIN HYDROCHLORIDE 60 MG/1
CAPSULE, DELAYED RELEASE ORAL
Qty: 180 CAPSULE | Refills: 0 | Status: SHIPPED | OUTPATIENT
Start: 2021-12-02

## 2021-12-17 ENCOUNTER — TELEPHONE (OUTPATIENT)
Dept: RHEUMATOLOGY | Facility: CLINIC | Age: 56
End: 2021-12-17

## 2021-12-17 NOTE — TELEPHONE ENCOUNTER
Called pt and left a voicemail reminding her to get the labs Dr. Abhishek Solano ordered at the beginning of November at any [place] location. [facility]

## 2022-01-04 ENCOUNTER — TELEPHONE (OUTPATIENT)
Dept: FAMILY MEDICINE CLINIC | Facility: CLINIC | Age: 57
End: 2022-01-04

## 2022-01-04 NOTE — TELEPHONE ENCOUNTER
Fax from [facility] stating Buspirone 7.5 mg strength is not covered.    Requesting alternative    Routed to [facility] to advise

## 2022-01-05 RX ORDER — BUSPIRONE HYDROCHLORIDE 15 MG/1
7.5 TABLET ORAL 3 TIMES DAILY
Qty: 45 TABLET | Refills: 0 | Status: SHIPPED | OUTPATIENT
Start: 2022-01-05 | End: 2022-01-31

## 2022-01-05 NOTE — TELEPHONE ENCOUNTER
Please let patient or caregiver know or leave message that: Buspar has been refilled. Due to insurance coverage, it was refilled at 15 mg pill dose. She should take 1/2 tab 3 times per day.   Thanks

## 2022-01-31 RX ORDER — BUSPIRONE HYDROCHLORIDE 15 MG/1
7.5 TABLET ORAL 3 TIMES DAILY
Qty: 45 TABLET | Refills: 0 | Status: SHIPPED | OUTPATIENT
Start: 2022-01-31

## 2022-02-25 RX ORDER — BUSPIRONE HYDROCHLORIDE 15 MG/1
7.5 TABLET ORAL 3 TIMES DAILY
Qty: 45 TABLET | Refills: 0 | Status: SHIPPED | OUTPATIENT
Start: 2022-02-25 | End: 2022-03-25

## 2022-02-25 NOTE — TELEPHONE ENCOUNTER
Routing to provider per protocol. BUSPIRONE 15 MG Oral Tab  Last refilled on 1/31/22 for #45  with 0 rf. Last labs 7/12/21. Last seen on 7/12/21. No future appointments. Thank you.

## 2022-03-01 DIAGNOSIS — F41.9 ANXIETY: ICD-10-CM

## 2022-03-01 RX ORDER — DULOXETIN HYDROCHLORIDE 60 MG/1
120 CAPSULE, DELAYED RELEASE ORAL DAILY
Qty: 180 CAPSULE | Refills: 0 | Status: SHIPPED | OUTPATIENT
Start: 2022-03-01 | End: 2022-05-27

## 2022-03-25 RX ORDER — BUSPIRONE HYDROCHLORIDE 15 MG/1
7.5 TABLET ORAL 3 TIMES DAILY
Qty: 45 TABLET | Refills: 0 | Status: SHIPPED | OUTPATIENT
Start: 2022-03-25

## 2022-03-25 NOTE — TELEPHONE ENCOUNTER
No refill protocol for this medication. Last refill: 2/25/2022 #45 with 0 refills  Last Visit: 7/12/2021   Next Visit: No future appointments. Forward to Dr. Lesli Crump please advise on refills. Thanks.

## 2022-04-23 RX ORDER — BUSPIRONE HYDROCHLORIDE 15 MG/1
7.5 TABLET ORAL 3 TIMES DAILY
Qty: 45 TABLET | Refills: 0 | Status: SHIPPED | OUTPATIENT
Start: 2022-04-23

## 2022-04-23 NOTE — TELEPHONE ENCOUNTER
Last refilled 3/25/22 for #45 with 0 RF  LOV with KE 7/12/21  No future appt with pcp  Protocol: none

## 2022-05-07 RX ORDER — BUSPIRONE HYDROCHLORIDE 15 MG/1
7.5 TABLET ORAL 3 TIMES DAILY
Qty: 135 TABLET | Refills: 0 | Status: SHIPPED | OUTPATIENT
Start: 2022-05-07 | End: 2022-08-05

## 2022-05-07 NOTE — TELEPHONE ENCOUNTER
Received fax from [facility] regarding 90 day Rx request for Buspirone    LOV 07/12/2021  Last refill on 04/23/2022, for #45 tabs, with 0 refills  BUSPIRONE 15 MG Oral Tab    No future appointments.       Order per protocol

## 2022-05-27 DIAGNOSIS — F41.9 ANXIETY: ICD-10-CM

## 2022-05-27 RX ORDER — DULOXETIN HYDROCHLORIDE 60 MG/1
CAPSULE, DELAYED RELEASE ORAL
Qty: 180 CAPSULE | Refills: 0 | Status: SHIPPED | OUTPATIENT
Start: 2022-05-27

## 2022-05-27 NOTE — TELEPHONE ENCOUNTER
Last refilled 3/1/22 for #180 with 0 RF  LOV with KE 7/12/21  No future appt with pcp  Protocol: none

## 2022-07-01 DIAGNOSIS — I10 ESSENTIAL HYPERTENSION: ICD-10-CM

## 2022-07-01 RX ORDER — LISINOPRIL AND HYDROCHLOROTHIAZIDE 25; 20 MG/1; MG/1
1 TABLET ORAL DAILY
Qty: 30 TABLET | Refills: 0 | Status: SHIPPED | OUTPATIENT
Start: 2022-07-01

## 2022-07-01 NOTE — TELEPHONE ENCOUNTER
Hypertension Medications Protocol Failed 07/01/2022 12:05 PM   Protocol Details  Appointment in past 6 or next 3 months    CMP or BMP in past 12 months    Last serum creatinine< 2.0     Last OV 7/12/21  Last lab 7/12/21 CMP/Creat 0.76  Last refilled 7/12/21  #30  11 refill    No future appointments.

## 2022-07-11 RX ORDER — BUSPIRONE HYDROCHLORIDE 15 MG/1
TABLET ORAL
Qty: 135 TABLET | Refills: 0 | Status: SHIPPED | OUTPATIENT
Start: 2022-07-11

## 2022-07-14 ENCOUNTER — TELEPHONE (OUTPATIENT)
Dept: FAMILY MEDICINE CLINIC | Facility: CLINIC | Age: 57
End: 2022-07-14

## 2022-07-14 NOTE — TELEPHONE ENCOUNTER
Letter mailed to patient reminding her she is due for a visit before next refills due.  See refill request from 7/10/22

## 2022-07-23 DIAGNOSIS — I10 ESSENTIAL HYPERTENSION: ICD-10-CM

## 2022-07-23 RX ORDER — LISINOPRIL AND HYDROCHLOROTHIAZIDE 25; 20 MG/1; MG/1
TABLET ORAL
Qty: 30 TABLET | Refills: 0 | Status: SHIPPED | OUTPATIENT
Start: 2022-07-23

## 2022-07-25 DIAGNOSIS — B00.2 ORAL HERPES: ICD-10-CM

## 2022-07-25 RX ORDER — ACYCLOVIR 50 MG/G
1 CREAM TOPICAL
Qty: 5 G | Refills: 1 | Status: SHIPPED | OUTPATIENT
Start: 2022-07-25

## 2022-08-27 DIAGNOSIS — I10 ESSENTIAL HYPERTENSION: ICD-10-CM

## 2022-08-29 RX ORDER — LISINOPRIL AND HYDROCHLOROTHIAZIDE 25; 20 MG/1; MG/1
TABLET ORAL
Qty: 30 TABLET | Refills: 0 | Status: SHIPPED | OUTPATIENT
Start: 2022-08-29

## 2022-09-23 DIAGNOSIS — F41.9 ANXIETY: ICD-10-CM

## 2022-09-23 RX ORDER — DULOXETIN HYDROCHLORIDE 60 MG/1
CAPSULE, DELAYED RELEASE ORAL
Qty: 60 CAPSULE | Refills: 0 | Status: SHIPPED | OUTPATIENT
Start: 2022-09-23

## 2022-09-23 RX ORDER — BUSPIRONE HYDROCHLORIDE 15 MG/1
TABLET ORAL
Qty: 45 TABLET | Refills: 0 | Status: SHIPPED | OUTPATIENT
Start: 2022-09-23

## 2022-09-23 RX ORDER — BUPROPION HYDROCHLORIDE 300 MG/1
TABLET ORAL
Qty: 30 TABLET | Refills: 0 | Status: SHIPPED | OUTPATIENT
Start: 2022-09-23

## 2022-09-23 NOTE — TELEPHONE ENCOUNTER
Last OV 7-  Last refilled:  7/12/21 bupropion #30  11 refills  5/27/22 duloxetine #180  0 refills  7/11/22 buspirone #135  0 refills      Future Appointments   Date Time Provider Jc Romo   10/17/2022  3:00 PM Zaina Mercado [facility] [place]

## 2022-09-26 DIAGNOSIS — R76.0 LUPUS ANTICOAGULANT POSITIVE: ICD-10-CM

## 2022-09-26 DIAGNOSIS — I10 ESSENTIAL HYPERTENSION: ICD-10-CM

## 2022-09-26 DIAGNOSIS — R68.2 DRY MOUTH: ICD-10-CM

## 2022-09-26 DIAGNOSIS — R76.8 POSITIVE ANA (ANTINUCLEAR ANTIBODY): ICD-10-CM

## 2022-09-26 RX ORDER — PILOCARPINE HYDROCHLORIDE 5 MG/1
TABLET, FILM COATED ORAL
Qty: 30 TABLET | Refills: 5 | OUTPATIENT
Start: 2022-09-26

## 2022-09-26 RX ORDER — LISINOPRIL AND HYDROCHLOROTHIAZIDE 25; 20 MG/1; MG/1
TABLET ORAL
Qty: 30 TABLET | Refills: 0 | Status: SHIPPED | OUTPATIENT
Start: 2022-09-26

## 2022-09-26 NOTE — TELEPHONE ENCOUNTER
Hypertension Medications Protocol Failed 09/26/2022 10:32 AM   Protocol Details  CMP or BMP in past 12 months    Last serum creatinine< 2.0    Appointment in past 6 or next 3 months     Last OV 7/12/21  Last lab 7/12/21  CMP/creat 0.76  Last refill 8/29/22  #30  0 refills     Future Appointments   Date Time Provider Jc Romo   10/17/2022  3:00 PM DO BRUCE Villarreal      per Jc YOON for 30 day supply    Script sent

## 2022-09-27 DIAGNOSIS — B00.2 ORAL HERPES: ICD-10-CM

## 2022-09-27 RX ORDER — VALACYCLOVIR HYDROCHLORIDE 500 MG/1
TABLET, FILM COATED ORAL
Qty: 90 TABLET | Refills: 0 | Status: SHIPPED | OUTPATIENT
Start: 2022-09-27

## 2022-09-27 NOTE — TELEPHONE ENCOUNTER
Herpes Agent Protocol Passed 09/27/2022 02:07 PM    Appointment in the past 12 or next 3 months     Upcoming appointment on 10 17 2022   Refilled per protocol

## 2022-10-15 DIAGNOSIS — F41.9 ANXIETY: ICD-10-CM

## 2022-10-15 NOTE — TELEPHONE ENCOUNTER
No refill protocol for this medication. Duloxetine:  Last refill: 9/23/2022 #60 with 0 refills    Bupropion:  Last refill: 9/23/2022 #30 with 0 refills    Last Visit: 7/12/2021  Next Visit:   Future Appointments   Date Time Provider Jc Romo   10/17/2022  3:00 PM Anjali Valenzuela [facility] [place]         Forward to Dr. Martha Sanchez please advise on refills. Thanks.

## 2022-10-18 RX ORDER — BUPROPION HYDROCHLORIDE 300 MG/1
TABLET ORAL
Qty: 30 TABLET | Refills: 0 | OUTPATIENT
Start: 2022-10-18

## 2022-10-18 RX ORDER — DULOXETIN HYDROCHLORIDE 60 MG/1
CAPSULE, DELAYED RELEASE ORAL
Qty: 60 CAPSULE | Refills: 0 | OUTPATIENT
Start: 2022-10-18

## 2022-10-31 ENCOUNTER — PATIENT MESSAGE (OUTPATIENT)
Dept: FAMILY MEDICINE CLINIC | Facility: CLINIC | Age: 57
End: 2022-10-31

## 2022-10-31 DIAGNOSIS — I10 ESSENTIAL HYPERTENSION: ICD-10-CM

## 2022-10-31 DIAGNOSIS — F41.9 ANXIETY: ICD-10-CM

## 2022-10-31 NOTE — TELEPHONE ENCOUNTER
Pt's insurance changed and now with [facility]. Pt has not been able to find a new doctor. She was wondering if Dr. Clarence Power would refill her prescriptions one more time. DULOXETINE 60 MG Oral Cap DR Particles [[id number]] (Order [id number])  BUPROPION  MG Oral Tablet 24 Hr [[id number]] (Order [id number])  LISINOPRIL-HYDROCHLOROTHIAZIDE 20-25 MG Oral Tab [[id number]] (Order [id number])  BUSPIRONE 15 MG Oral Tab [[id number]] (Order [id number])      [facility]/pharmacy [address] [place] [address]. [facility] [address], [phone], Kem Vanegas. [facility] [address] [facility] [address]   Phone: [phone] Fax: [phone]   Hours: Not open 24 hours       Please advise. Thank you!

## 2022-11-01 RX ORDER — BUPROPION HYDROCHLORIDE 300 MG/1
300 TABLET ORAL DAILY
Qty: 30 TABLET | Refills: 0 | Status: SHIPPED | OUTPATIENT
Start: 2022-11-01

## 2022-11-01 RX ORDER — DULOXETIN HYDROCHLORIDE 60 MG/1
120 CAPSULE, DELAYED RELEASE ORAL DAILY
Qty: 60 CAPSULE | Refills: 0 | Status: SHIPPED | OUTPATIENT
Start: 2022-11-01

## 2022-11-01 RX ORDER — BUSPIRONE HYDROCHLORIDE 15 MG/1
7.5 TABLET ORAL 3 TIMES DAILY
Qty: 45 TABLET | Refills: 0 | Status: SHIPPED | OUTPATIENT
Start: 2022-11-01

## 2022-11-01 RX ORDER — LISINOPRIL AND HYDROCHLOROTHIAZIDE 25; 20 MG/1; MG/1
1 TABLET ORAL DAILY
Qty: 30 TABLET | Refills: 0 | Status: SHIPPED | OUTPATIENT
Start: 2022-11-01

## 2022-11-23 DIAGNOSIS — F41.9 ANXIETY: ICD-10-CM

## 2022-11-23 DIAGNOSIS — I10 ESSENTIAL HYPERTENSION: ICD-10-CM

## 2022-11-23 NOTE — TELEPHONE ENCOUNTER
LOV 07/12/2021  Last labs 07/12/2021  Last refill on 11/01/22, for #60 caps, with 0 refills  DULoxetine 60 MG Oral Cap DR Particles    Last refill on 11/01/22, for #30 tabs, with 0 refills  lisinopril-hydroCHLOROthiazide 20-25 MG Oral Tab    Last refill on 11/01/22, for #45 tabs, with 0 refills  busPIRone 15 MG Oral Tab    Last refill on 11/01/22, for #30 tabs, with 0 refills  buPROPion  MG Oral Tablet 24 Hr    No future appointments. Order(s) pending, please review. Thank you.

## 2022-11-25 RX ORDER — BUSPIRONE HYDROCHLORIDE 15 MG/1
TABLET ORAL
Qty: 45 TABLET | Refills: 0 | OUTPATIENT
Start: 2022-11-25

## 2022-11-25 RX ORDER — BUPROPION HYDROCHLORIDE 300 MG/1
300 TABLET ORAL DAILY
Qty: 30 TABLET | Refills: 0 | OUTPATIENT
Start: 2022-11-25

## 2022-11-25 RX ORDER — DULOXETIN HYDROCHLORIDE 60 MG/1
120 CAPSULE, DELAYED RELEASE ORAL DAILY
Qty: 60 CAPSULE | Refills: 0 | OUTPATIENT
Start: 2022-11-25

## 2022-11-25 RX ORDER — LISINOPRIL AND HYDROCHLOROTHIAZIDE 25; 20 MG/1; MG/1
TABLET ORAL
Qty: 30 TABLET | Refills: 0 | OUTPATIENT
Start: 2022-11-25

## 2022-12-01 ENCOUNTER — PATIENT MESSAGE (OUTPATIENT)
Dept: FAMILY MEDICINE CLINIC | Facility: CLINIC | Age: 57
End: 2022-12-01

## 2022-12-01 DIAGNOSIS — I10 ESSENTIAL HYPERTENSION: ICD-10-CM

## 2022-12-01 DIAGNOSIS — F41.9 ANXIETY: ICD-10-CM

## 2022-12-02 RX ORDER — BUSPIRONE HYDROCHLORIDE 15 MG/1
7.5 TABLET ORAL 3 TIMES DAILY
Qty: 10 TABLET | Refills: 0 | Status: SHIPPED | OUTPATIENT
Start: 2022-12-02

## 2022-12-02 RX ORDER — DULOXETIN HYDROCHLORIDE 60 MG/1
120 CAPSULE, DELAYED RELEASE ORAL DAILY
Qty: 10 CAPSULE | Refills: 0 | Status: SHIPPED | OUTPATIENT
Start: 2022-12-02

## 2022-12-02 RX ORDER — BUPROPION HYDROCHLORIDE 300 MG/1
300 TABLET ORAL DAILY
Qty: 5 TABLET | Refills: 0 | Status: SHIPPED | OUTPATIENT
Start: 2022-12-02

## 2022-12-02 RX ORDER — LISINOPRIL AND HYDROCHLOROTHIAZIDE 25; 20 MG/1; MG/1
1 TABLET ORAL DAILY
Qty: 5 TABLET | Refills: 0 | Status: SHIPPED | OUTPATIENT
Start: 2022-12-02

## 2022-12-02 NOTE — TELEPHONE ENCOUNTER
From: Marie Aguiar  To: Berna Webster DO  Sent: 12/1/2022 8:11 PM CST  Subject: Prescriptions    Hi Dr. Cyrus Painting,  I had a real hard time finding a new provider many that are listed on my plan they have stopped accepting [facility]. I was finally able to schedule an appointment first available was after a few days of my prescriptions running out on 12/7. I will be completely out of my duloxetine, bupropion, buspirione, & lisinopril is there any way that you could refill 5 days worth of each? That will be enough to last until my new pcp can fill all of my prescriptions on the 7th. I apologize for asking this of you, Bev Shown never been in this situation before. Unfortunately I lost my job with [employer] and [address] for me and it is the absolute worst public aid funded insurance ever. I hope you and your family have blessed holiday and thank you once again for your excellent care.     Sincerely,    Erick Kaur

## 2023-04-02 ENCOUNTER — PATIENT OUTREACH (OUTPATIENT)
Dept: CASE MANAGEMENT | Age: 58
End: 2023-04-02

## 2023-04-02 NOTE — PROCEDURES
The office order for PCP removal request is Approved and finalized on April 2, 2023.     Thanks,  [facility] [employer]

## 2023-04-07 ENCOUNTER — APPOINTMENT (OUTPATIENT)
Dept: CT IMAGING | Age: 58
End: 2023-04-07
Attending: EMERGENCY MEDICINE
Payer: MEDICAID

## 2023-04-07 ENCOUNTER — HOSPITAL ENCOUNTER (EMERGENCY)
Age: 58
Discharge: HOME | End: 2023-04-07
Attending: EMERGENCY MEDICINE
Payer: MEDICAID

## 2023-04-07 VITALS
RESPIRATION RATE: 18 BRPM | BODY MASS INDEX: 24.15 KG/M2 | OXYGEN SATURATION: 98 % | WEIGHT: 123 LBS | HEIGHT: 60 IN | TEMPERATURE: 98 F | DIASTOLIC BLOOD PRESSURE: 104 MMHG | HEART RATE: 86 BPM | SYSTOLIC BLOOD PRESSURE: 147 MMHG

## 2023-04-07 DIAGNOSIS — M54.12 CERVICAL RADICULOPATHY: Primary | ICD-10-CM

## 2023-04-07 PROCEDURE — 72125 CT NECK SPINE W/O DYE: CPT | Performed by: EMERGENCY MEDICINE

## 2023-04-07 PROCEDURE — 99284 EMERGENCY DEPT VISIT MOD MDM: CPT

## 2023-04-07 RX ORDER — METHYLPREDNISOLONE 4 MG/1
TABLET ORAL
Qty: 1 EACH | Refills: 0 | Status: SHIPPED | OUTPATIENT
Start: 2023-04-07

## 2023-04-07 RX ORDER — HYDROCODONE BITARTRATE AND ACETAMINOPHEN 5; 325 MG/1; MG/1
1-2 TABLET ORAL EVERY 6 HOURS PRN
Qty: 10 TABLET | Refills: 0 | Status: SHIPPED | OUTPATIENT
Start: 2023-04-07 | End: 2023-04-12

## 2023-04-07 NOTE — ED INITIAL ASSESSMENT (HPI)
Pt has had r shoulder and r neck pain radiates down r arm. Numbness and tingling in r hand. repetitve movement of r arm.

## 2023-08-19 ENCOUNTER — HOSPITAL ENCOUNTER (EMERGENCY)
Age: 58
Discharge: HOME | End: 2023-08-19
Attending: EMERGENCY MEDICINE
Payer: COMMERCIAL

## 2023-08-19 VITALS
HEIGHT: 63 IN | OXYGEN SATURATION: 98 % | SYSTOLIC BLOOD PRESSURE: 137 MMHG | HEART RATE: 89 BPM | TEMPERATURE: 98 F | RESPIRATION RATE: 16 BRPM | DIASTOLIC BLOOD PRESSURE: 89 MMHG | BODY MASS INDEX: 22.15 KG/M2 | WEIGHT: 125 LBS

## 2023-08-19 DIAGNOSIS — M54.12 CERVICAL RADICULOPATHY: Primary | ICD-10-CM

## 2023-08-19 PROCEDURE — 99283 EMERGENCY DEPT VISIT LOW MDM: CPT

## 2023-08-19 PROCEDURE — 99284 EMERGENCY DEPT VISIT MOD MDM: CPT

## 2023-08-19 RX ORDER — METHYLPREDNISOLONE 4 MG/1
TABLET ORAL
Qty: 1 EACH | Refills: 0 | Status: SHIPPED | OUTPATIENT
Start: 2023-08-19 | End: 2023-09-01

## 2023-08-19 RX ORDER — TRAZODONE HYDROCHLORIDE 100 MG/1
100 TABLET ORAL NIGHTLY
COMMUNITY
Start: 2023-07-10

## 2023-08-19 RX ORDER — HYDROCODONE BITARTRATE AND ACETAMINOPHEN 5; 325 MG/1; MG/1
1-2 TABLET ORAL EVERY 6 HOURS PRN
Qty: 10 TABLET | Refills: 0 | Status: SHIPPED | OUTPATIENT
Start: 2023-08-19 | End: 2023-08-24

## 2023-08-19 RX ORDER — CYCLOBENZAPRINE HCL 10 MG
10 TABLET ORAL 3 TIMES DAILY PRN
Qty: 20 TABLET | Refills: 0 | Status: SHIPPED | OUTPATIENT
Start: 2023-08-19 | End: 2023-08-26

## 2023-09-01 ENCOUNTER — OFFICE VISIT (OUTPATIENT)
Dept: SURGERY | Facility: CLINIC | Age: 58
End: 2023-09-01
Payer: COMMERCIAL

## 2023-09-01 VITALS
DIASTOLIC BLOOD PRESSURE: 84 MMHG | SYSTOLIC BLOOD PRESSURE: 118 MMHG | BODY MASS INDEX: 22.5 KG/M2 | HEART RATE: 86 BPM | WEIGHT: 127 LBS | HEIGHT: 63 IN

## 2023-09-01 DIAGNOSIS — M54.2 NECK PAIN: ICD-10-CM

## 2023-09-01 DIAGNOSIS — M54.12 CERVICAL RADICULOPATHY: ICD-10-CM

## 2023-09-01 DIAGNOSIS — M62.838 CERVICAL PARASPINAL MUSCLE SPASM: Primary | ICD-10-CM

## 2023-09-01 RX ORDER — OMEPRAZOLE 20 MG/1
20 CAPSULE, DELAYED RELEASE ORAL DAILY
COMMUNITY
Start: 2023-08-12

## 2023-09-01 RX ORDER — HYDROCODONE BITARTRATE AND ACETAMINOPHEN 10; 325 MG/1; MG/1
TABLET ORAL
COMMUNITY

## 2023-09-01 RX ORDER — CYCLOBENZAPRINE HCL 10 MG
10 TABLET ORAL 2 TIMES DAILY PRN
Qty: 30 TABLET | Refills: 0 | Status: SHIPPED | OUTPATIENT
Start: 2023-09-01

## 2023-09-01 RX ORDER — MELOXICAM 7.5 MG/1
7.5 TABLET ORAL DAILY
Qty: 30 TABLET | Refills: 0 | Status: SHIPPED | OUTPATIENT
Start: 2023-09-01

## 2023-09-01 RX ORDER — IBUPROFEN 800 MG/1
800 TABLET ORAL
COMMUNITY
Start: 2023-07-31

## 2023-09-12 ENCOUNTER — HOSPITAL ENCOUNTER (OUTPATIENT)
Dept: GENERAL RADIOLOGY | Age: 58
Discharge: HOME | End: 2023-09-12
Payer: COMMERCIAL

## 2023-09-12 DIAGNOSIS — M54.12 CERVICAL RADICULOPATHY: ICD-10-CM

## 2023-09-12 PROCEDURE — 72052 X-RAY EXAM NECK SPINE 6/>VWS: CPT

## 2023-09-13 ENCOUNTER — TELEPHONE (OUTPATIENT)
Dept: PHYSICAL THERAPY | Facility: HOSPITAL | Age: 58
End: 2023-09-13

## 2023-09-19 ENCOUNTER — OFFICE VISIT (OUTPATIENT)
Facility: LOCATION | Age: 58
End: 2023-09-19
Payer: COMMERCIAL

## 2023-09-19 DIAGNOSIS — M54.12 CERVICAL RADICULOPATHY: Primary | ICD-10-CM

## 2023-09-19 DIAGNOSIS — M54.2 NECK PAIN: ICD-10-CM

## 2023-09-19 PROCEDURE — 97110 THERAPEUTIC EXERCISES: CPT

## 2023-09-19 PROCEDURE — 97161 PT EVAL LOW COMPLEX 20 MIN: CPT

## 2023-09-22 ENCOUNTER — OFFICE VISIT (OUTPATIENT)
Facility: LOCATION | Age: 58
End: 2023-09-22
Payer: COMMERCIAL

## 2023-09-22 PROCEDURE — 97110 THERAPEUTIC EXERCISES: CPT

## 2023-09-22 PROCEDURE — 97140 MANUAL THERAPY 1/> REGIONS: CPT

## 2023-09-22 NOTE — PROGRESS NOTES
Diagnosis:   Cervical radiculopathy (M54.12)  Neck pain (M54.2)      Referring Provider: Santos Robins  Date of Evaluation:    9/19/23    Precautions:   Next MD visit:   none scheduled  Date of Surgery: n/a   Insurance Primary/Secondary[place] / N/[place]     # Auth Visits: 8            Subjective: able to sleep on side better; not as much arm symptoms past 3 days     Pain: 0/10      Objective: see flowsheet      Assessment:   Tenderness noted mid to lower cervical spine. Hypomobility noted  Increased thoracic kyphosis. Pt tolerated treatment well with improved ability to actively rotate head to (L) with less pain/resistance       Goals:   Pt will improve (L) cervical AROM rotation by 5-8 degrees to improve tolerance for turning head to check blind spot while driving  Pt will have improved thoracic PA mobility to WNL to improve cervical ROM as well as promote upright posturing and decreased pain   Pt will report less paresthesia in (L) UE. Pt will report being able to sit for >60 minutes with less reported pain and restriction   Pt will perform all ADLs and household chores/tasks with less reported pain   Pt will be able to assist mother with less reported pain and restriction   Pt will be independent and compliant with comprehensive HEP to maintain progress achieved in PT      Plan: work on centralizing symptoms, improving mid to lower cervical mobility  Date: 9/22/2023  TX#: 2/8 Date:                 TX#: 3/ Date:                 TX#: 4/ Date:                 TX#: 5/ Date:    Tx#: 6/   Manual Therapy        STM/MFR suboccipitals, cervical paraspinals, UT  (L) 1st rib mobilization  (L) mid to lower cervical spine downglides & contralateral upglides, grade 3       TherEx       UBE x 6 min (L1)       Prone scapular retraction x 20 (5 second hold)       Supine chin tuck w/head lift x 20        (L) first rib mobilization x 20                     HEP: seated scapular retraction, (L) UT & LS stretches     Charges: Manual 2; TherEx 1       Total Timed Treatment: 45 min  Total Treatment Time: 45 min

## 2023-09-26 ENCOUNTER — APPOINTMENT (OUTPATIENT)
Facility: LOCATION | Age: 58
End: 2023-09-26
Payer: COMMERCIAL

## 2023-09-26 ENCOUNTER — TELEPHONE (OUTPATIENT)
Dept: PHYSICAL THERAPY | Facility: HOSPITAL | Age: 58
End: 2023-09-26

## 2023-09-26 ENCOUNTER — OFFICE VISIT (OUTPATIENT)
Facility: LOCATION | Age: 58
End: 2023-09-26
Payer: COMMERCIAL

## 2023-09-26 PROCEDURE — 97110 THERAPEUTIC EXERCISES: CPT

## 2023-09-26 PROCEDURE — 97140 MANUAL THERAPY 1/> REGIONS: CPT

## 2023-09-26 NOTE — PROGRESS NOTES
Diagnosis:   Cervical radiculopathy (M54.12)  Neck pain (M54.2)      Referring Provider: Milan Jiang  Date of Evaluation:    9/19/23    Precautions:   Next MD visit:   none scheduled  Date of Surgery: n/a   Insurance Primary/Secondary[place] / N/[place]     # Auth Visits: 8            Subjective:  a bit of pain trying to move some things for her mother    Pain: 2/10      Objective: see flowsheet      Assessment:   Demonstrates restrictions noted with (L) active cervical rotation. Improved with 1st rib and upper to mid thoracic mobilization   Moderate cueing needed for exercise performance  No headaches or paresthesia throughout most of session with exception of doorway chest stretch (had to cease exercise due to increased symptoms)      Goals:   Pt will improve (L) cervical AROM rotation by 5-8 degrees to improve tolerance for turning head to check blind spot while driving  Pt will have improved thoracic PA mobility to WNL to improve cervical ROM as well as promote upright posturing and decreased pain   Pt will report less paresthesia in (L) UE. Pt will report being able to sit for >60 minutes with less reported pain and restriction   Pt will perform all ADLs and household chores/tasks with less reported pain   Pt will be able to assist mother with less reported pain and restriction   Pt will be independent and compliant with comprehensive HEP to maintain progress achieved in PT      Plan: work on centralizing symptoms, improving mid to lower cervical mobility  Date: 9/22/2023  TX#: 2/8 Date: 9/26/23                 TX#: 3/8 Date:                 TX#: 4/ Date:                 TX#: 5/ Date:    Tx#: 6/   Manual Therapy  Manual Therapy       STM/MFR suboccipitals, cervical paraspinals, UT  (L) 1st rib mobilization  (L) mid to lower cervical spine downglides & contralateral upglides, grade 3 STM/MFR suboccipitals, cervical paraspinals, UT  (L) 1st rib mobilization  Prone upper to mid thoracic PA glide, grade 3      TherEx TherEx      UBE x 6 min (L1) UBE x 6 min (L1)      Prone scapular retraction x 20 (5 second hold) Prone scapular retraction x 20 (5 second hold)      Supine chin tuck w/head lift x 20  Supine chin tuck w/head lift x 20       (L) first rib mobilization w/strap x 20 (L) first rib mobilization w/strap x 20       Standing scapular retraction w/shoulder extension BKTB x 20       Doorway chest stretch 30 sec x 2       Foam Roll thoracic mobilization x 20; backstroke x 20; clapping x 20      HEP: seated scapular retraction, (L) UT & LS stretches     Charges: Manual 2;  TherEx 1       Total Timed Treatment: 45 min  Total Treatment Time: 45 min

## 2023-09-26 NOTE — PROGRESS NOTES
Diagnosis:   Cervical radiculopathy (M54.12)  Neck pain (M54.2)      Referring Provider: Mary West  Date of Evaluation:    9/19/23    Precautions:   Next MD visit:   none scheduled  Date of Surgery: n/a   Insurance Primary/Secondary[place] / [place]     # Auth Visits: 8            Subjective: ***    Pain: 0/10      Objective: see flowsheet      Assessment:   ***  Tenderness noted mid to lower cervical spine. Hypomobility noted  Increased thoracic kyphosis. Pt tolerated treatment well with improved ability to actively rotate head to (L) with less pain/resistance       Goals:   Pt will improve (L) cervical AROM rotation by 5-8 degrees to improve tolerance for turning head to check blind spot while driving  Pt will have improved thoracic PA mobility to WNL to improve cervical ROM as well as promote upright posturing and decreased pain   Pt will report less paresthesia in (L) UE. Pt will report being able to sit for >60 minutes with less reported pain and restriction   Pt will perform all ADLs and household chores/tasks with less reported pain   Pt will be able to assist mother with less reported pain and restriction   Pt will be independent and compliant with comprehensive HEP to maintain progress achieved in PT      Plan: work on centralizing symptoms, improving mid to lower cervical mobility  Date: 9/22/2023  TX#: 2/8 Date: 9/26/23               TX#: 3/8 Date:                 TX#: 4/ Date:                 TX#: 5/ Date: Tx#: 6/   Manual Therapy  Manual Therapy       STM/MFR suboccipitals, cervical paraspinals, UT  (L) 1st rib mobilization  (L) mid to lower cervical spine downglides & contralateral upglides, grade 3       TherEx TherEx      UBE x 6 min (L1)       Prone scapular retraction x 20 (5 second hold)       Supine chin tuck w/head lift x 20        (L) first rib mobilization x 20                     HEP: seated scapular retraction, (L) UT & LS stretches     Charges: Manual 2;  TherEx 1       Total Timed Treatment: 45 min  Total Treatment Time: 45 min

## 2023-09-28 ENCOUNTER — APPOINTMENT (OUTPATIENT)
Facility: LOCATION | Age: 58
End: 2023-09-28
Payer: COMMERCIAL

## 2023-09-29 ENCOUNTER — APPOINTMENT (OUTPATIENT)
Facility: LOCATION | Age: 58
End: 2023-09-29
Payer: COMMERCIAL

## 2023-09-29 NOTE — PROGRESS NOTES
Diagnosis:   Cervical radiculopathy (M54.12)  Neck pain (M54.2)      Referring Provider: Trey Goode  Date of Evaluation:    9/19/23    Precautions:   Next MD visit:   none scheduled  Date of Surgery: n/a   Insurance Primary/Secondary[place] / N/[place]     # Auth Visits: 8            Subjective:  ***    Pain: ***/10      Objective: see flowsheet      Assessment:   ***  Demonstrates restrictions noted with (L) active cervical rotation. Improved with 1st rib and upper to mid thoracic mobilization   Moderate cueing needed for exercise performance  No headaches or paresthesia throughout most of session with exception of doorway chest stretch (had to cease exercise due to increased symptoms)      Goals:   Pt will improve (L) cervical AROM rotation by 5-8 degrees to improve tolerance for turning head to check blind spot while driving  Pt will have improved thoracic PA mobility to WNL to improve cervical ROM as well as promote upright posturing and decreased pain   Pt will report less paresthesia in (L) UE. Pt will report being able to sit for >60 minutes with less reported pain and restriction   Pt will perform all ADLs and household chores/tasks with less reported pain   Pt will be able to assist mother with less reported pain and restriction   Pt will be independent and compliant with comprehensive HEP to maintain progress achieved in PT      Plan: work on centralizing symptoms, improving mid to lower cervical mobility  Date: 9/22/2023  TX#: 2/8 Date: 9/26/23                 TX#: 3/8 Date: 9/29/23               TX#: 4/8 Date:                 TX#: 5/ Date:    Tx#: 6/   Manual Therapy  Manual Therapy  Manual Therapy      STM/MFR suboccipitals, cervical paraspinals, UT  (L) 1st rib mobilization  (L) mid to lower cervical spine downglides & contralateral upglides, grade 3 STM/MFR suboccipitals, cervical paraspinals, UT  (L) 1st rib mobilization  Prone upper to mid thoracic PA glide, grade 3 STM/MFR suboccipitals, cervical paraspinals, UT  (L) 1st rib mobilization  Prone upper to mid thoracic PA glide, grade 3     TherEx TherEx TherEx     UBE x 6 min (L1) UBE x 6 min (L1) UBE x 6 min (L1)     Prone scapular retraction x 20 (5 second hold) Prone scapular retraction x 20 (5 second hold) Prone scapular retraction x 20 (5 second hold)     Supine chin tuck w/head lift x 20  Supine chin tuck w/head lift x 20  Supine chin tuck w/head lift x 20      (L) first rib mobilization w/strap x 20 (L) first rib mobilization w/strap x 20 Standing scapular retraction w/shoulder extension BKTB x 20      Standing scapular retraction w/shoulder extension BKTB x 20 Foam Roll thoracic mobilization x 20; backstroke x 20; clapping x 20      Doorway chest stretch 30 sec x 2 Seated 1st rib mobilization x 20  Seated (L) UT stretch 30 sec x 3      Foam Roll thoracic mobilization x 20; backstroke x 20; clapping x 20      HEP: seated scapular retraction, (L) UT & LS stretches     Charges: Manual 2;  TherEx 1       Total Timed Treatment: 45 min  Total Treatment Time: 45 min

## 2023-10-03 ENCOUNTER — APPOINTMENT (OUTPATIENT)
Facility: LOCATION | Age: 58
End: 2023-10-03
Payer: COMMERCIAL

## 2023-10-06 ENCOUNTER — APPOINTMENT (OUTPATIENT)
Facility: LOCATION | Age: 58
End: 2023-10-06
Payer: COMMERCIAL

## 2023-10-10 ENCOUNTER — APPOINTMENT (OUTPATIENT)
Facility: LOCATION | Age: 58
End: 2023-10-10
Payer: COMMERCIAL

## 2023-10-12 ENCOUNTER — APPOINTMENT (OUTPATIENT)
Facility: LOCATION | Age: 58
End: 2023-10-12
Payer: COMMERCIAL

## 2025-07-29 ENCOUNTER — APPOINTMENT (OUTPATIENT)
Dept: DERMATOLOGY | Age: 60
End: 2025-07-29

## (undated) NOTE — LETTER
Date & Time: 4/7/2023, 6:26 PM  Patient: Shanita Gonsalves  Encounter Provider(s):    Rukhsana Ramos MD       To Whom It May Concern:    Jenna Easley was seen and treated in our department on 4/7/2023. She can return to work 4/17/2023.     If you have any questions or concerns, please do not hesitate to call.        _____________________________  Physician/APC Signature

## (undated) NOTE — ED AVS SNAPSHOT
Jason Carpio   MRN: [id number]    Department:  [facility] Emergency Department in [place]   Date of Visit:  11/7/2018           Disclosure     Insurance plans vary and the physician(s) referred by the ER may not be covered by your plan.  Please contac tell this physician (or your personal doctor if your instructions are to return to your personal doctor) about any new or lasting problems. The primary care or specialist physician will see patients referred from the [facility] Emergency Department.  Moustapha Maldonado

## (undated) NOTE — LETTER
Aron Brunner [address]  [facility] [address]    7/5/2019      Dear  Aron Brunner    In order to provide the highest quality care, [facility] uses a sophisticated computer system to track our patient'

## (undated) NOTE — LETTER
Sj Green  [address] [place] [address] Dr. Nicolasa Melendez [id number]    12/3/2020      Dear  Sj Green    In order to provide the highest quality care, [facility] uses a sophisticated computer system to track our patient'

## (undated) NOTE — LETTER
Conrado Lincoln  [address] [place] [address] Dr. Mckenzie Chiu [address]    7/14/2022      Dear  Conrado Lincoln    In order to provide the highest quality care, [facility] uses a sophisticated computer system to track our patient's records. You are due for an appointment with Dr. Geno Lopez before your next refills.     Please call the office at your earliest convenience to schedule your appointment or test.          Sincerely,        The office of [facility]  [phone]

## (undated) NOTE — ED AVS SNAPSHOT
Khanh Curry   MRN: [id number]    Department:  [facility] Emergency Department in [place]   Date of Visit:  7/28/2017           Disclosure     Insurance plans vary and the physician(s) referred by the ER may not be covered by your plan.  Please contac If you have been prescribed any medication(s), please fill your prescription right away and begin taking the medication(s) as directed    If the emergency physician has read X-rays, these will be re-interpreted by a radiologist.  If there is a significant

## (undated) NOTE — LETTER
Date & Time: 11/7/2018, 4:27 PM  Patient: Sonia Scales  Encounter Provider(s):    Dusty Echols MD       To Whom It May Concern:    Reeves Boxer was seen and treated in our department on 11/7/2018. She should not return to work until 11/12.     If you

## (undated) NOTE — LETTER
10/22/2021       Quinn Mitchell   [id number] [id number]      Dear Shwetha Vanegas,    IF YOU ARE 48YEARS OF AGE OR OLDER, COLORECTAL CANCER SCREENING CAN SAVE YOUR LIFE.     As your primary care doctor, I want to do everything I can to protect yo

## (undated) NOTE — Clinical Note
Referring:  Arin Vigil, DO  [address] [facility]      Dear Dr. John Recinos: Thank you for referring your patient to me for an evaluation. Please see my attached note for my findings and recommendations.  Should you have any que

## (undated) NOTE — LETTER
12/31/19        Ayanna Cornea Adelaida Gil [facility] [id number]      Dear Joseph Ag,    [facility] records indicate that you have outstanding lab work and or testing that was ordered for you and has not yet been completed:  Lab Frequency Next Occurrence